# Patient Record
Sex: MALE | Race: WHITE | NOT HISPANIC OR LATINO | Employment: OTHER | URBAN - METROPOLITAN AREA
[De-identification: names, ages, dates, MRNs, and addresses within clinical notes are randomized per-mention and may not be internally consistent; named-entity substitution may affect disease eponyms.]

---

## 2019-07-30 ENCOUNTER — OFFICE VISIT (OUTPATIENT)
Dept: FAMILY MEDICINE | Facility: CLINIC | Age: 79
End: 2019-07-30
Payer: COMMERCIAL

## 2019-07-30 VITALS
BODY MASS INDEX: 27.58 KG/M2 | DIASTOLIC BLOOD PRESSURE: 80 MMHG | HEART RATE: 60 BPM | TEMPERATURE: 97 F | HEIGHT: 71 IN | OXYGEN SATURATION: 98 % | SYSTOLIC BLOOD PRESSURE: 130 MMHG | WEIGHT: 197 LBS

## 2019-07-30 DIAGNOSIS — Z00.00 MEDICARE ANNUAL WELLNESS VISIT, SUBSEQUENT: Primary | ICD-10-CM

## 2019-07-30 DIAGNOSIS — Z13.220 LIPID SCREENING: ICD-10-CM

## 2019-07-30 DIAGNOSIS — Z12.5 SCREENING FOR PROSTATE CANCER: ICD-10-CM

## 2019-07-30 LAB
ALBUMIN SERPL-MCNC: 3.8 G/DL (ref 3.4–5)
ALP SERPL-CCNC: 61 U/L (ref 40–150)
ALT SERPL W P-5'-P-CCNC: 24 U/L (ref 0–70)
ANION GAP SERPL CALCULATED.3IONS-SCNC: 3 MMOL/L (ref 3–14)
AST SERPL W P-5'-P-CCNC: 18 U/L (ref 0–45)
BASOPHILS # BLD AUTO: 0 10E9/L (ref 0–0.2)
BASOPHILS NFR BLD AUTO: 0.2 %
BILIRUB SERPL-MCNC: 0.9 MG/DL (ref 0.2–1.3)
BUN SERPL-MCNC: 14 MG/DL (ref 7–30)
CALCIUM SERPL-MCNC: 9.1 MG/DL (ref 8.5–10.1)
CHLORIDE SERPL-SCNC: 109 MMOL/L (ref 94–109)
CHOLEST SERPL-MCNC: 195 MG/DL
CO2 SERPL-SCNC: 28 MMOL/L (ref 20–32)
CREAT SERPL-MCNC: 0.91 MG/DL (ref 0.66–1.25)
DIFFERENTIAL METHOD BLD: NORMAL
EOSINOPHIL # BLD AUTO: 0 10E9/L (ref 0–0.7)
EOSINOPHIL NFR BLD AUTO: 0.5 %
ERYTHROCYTE [DISTWIDTH] IN BLOOD BY AUTOMATED COUNT: 14.6 % (ref 10–15)
GFR SERPL CREATININE-BSD FRML MDRD: 80 ML/MIN/{1.73_M2}
GLUCOSE SERPL-MCNC: 90 MG/DL (ref 70–99)
HCT VFR BLD AUTO: 44 % (ref 40–53)
HDLC SERPL-MCNC: 43 MG/DL
HGB BLD-MCNC: 14.9 G/DL (ref 13.3–17.7)
LDLC SERPL CALC-MCNC: 133 MG/DL
LYMPHOCYTES # BLD AUTO: 1.8 10E9/L (ref 0.8–5.3)
LYMPHOCYTES NFR BLD AUTO: 30.6 %
MCH RBC QN AUTO: 31.2 PG (ref 26.5–33)
MCHC RBC AUTO-ENTMCNC: 33.9 G/DL (ref 31.5–36.5)
MCV RBC AUTO: 92 FL (ref 78–100)
MONOCYTES # BLD AUTO: 0.7 10E9/L (ref 0–1.3)
MONOCYTES NFR BLD AUTO: 12.3 %
NEUTROPHILS # BLD AUTO: 3.4 10E9/L (ref 1.6–8.3)
NEUTROPHILS NFR BLD AUTO: 56.4 %
NONHDLC SERPL-MCNC: 152 MG/DL
PLATELET # BLD AUTO: 194 10E9/L (ref 150–450)
POTASSIUM SERPL-SCNC: 4.6 MMOL/L (ref 3.4–5.3)
PROT SERPL-MCNC: 7.5 G/DL (ref 6.8–8.8)
PSA SERPL-ACNC: 1.57 UG/L (ref 0–4)
RBC # BLD AUTO: 4.77 10E12/L (ref 4.4–5.9)
SODIUM SERPL-SCNC: 140 MMOL/L (ref 133–144)
TRIGL SERPL-MCNC: 95 MG/DL
WBC # BLD AUTO: 5.9 10E9/L (ref 4–11)

## 2019-07-30 PROCEDURE — 80053 COMPREHEN METABOLIC PANEL: CPT | Performed by: INTERNAL MEDICINE

## 2019-07-30 PROCEDURE — 85025 COMPLETE CBC W/AUTO DIFF WBC: CPT | Performed by: INTERNAL MEDICINE

## 2019-07-30 PROCEDURE — 36415 COLL VENOUS BLD VENIPUNCTURE: CPT | Performed by: INTERNAL MEDICINE

## 2019-07-30 PROCEDURE — G0103 PSA SCREENING: HCPCS | Performed by: INTERNAL MEDICINE

## 2019-07-30 PROCEDURE — 80061 LIPID PANEL: CPT | Performed by: INTERNAL MEDICINE

## 2019-07-30 PROCEDURE — G0439 PPPS, SUBSEQ VISIT: HCPCS | Performed by: INTERNAL MEDICINE

## 2019-07-30 SDOH — HEALTH STABILITY: MENTAL HEALTH: HOW MANY STANDARD DRINKS CONTAINING ALCOHOL DO YOU HAVE ON A TYPICAL DAY?: 1 OR 2

## 2019-07-30 SDOH — HEALTH STABILITY: MENTAL HEALTH: HOW MANY DRINKS CONTAINING ALCOHOL DO YOU HAVE ON A TYPICAL DAY WHEN YOU ARE DRINKING?: 1 OR 2

## 2019-07-30 ASSESSMENT — MIFFLIN-ST. JEOR: SCORE: 1630.72

## 2019-07-30 ASSESSMENT — ACTIVITIES OF DAILY LIVING (ADL): CURRENT_FUNCTION: NO ASSISTANCE NEEDED

## 2019-07-30 NOTE — PATIENT INSTRUCTIONS
Maintain low fat/calorie diet and regular exercise.    Follow up yearly or as needed.      Patient Education   Personalized Prevention Plan  You are due for the preventive services outlined below.  Your care team is available to assist you in scheduling these services.  If you have already completed any of these items, please share that information with your care team to update in your medical record.  Health Maintenance Due   Topic Date Due     Discuss Advance Care Planning  1940     Annual Wellness Visit  06/16/2005     FALL RISK ASSESSMENT  06/16/2005     Pneumococcal Vaccine (1 of 2 - PCV13) 06/16/2005     Zoster (Shingles) Vaccine (2 of 3) 12/27/2013

## 2019-07-30 NOTE — PROGRESS NOTES
SUBJECTIVE:   Yoel Stevenson is a 79 year old male who presents for Preventive Visit.    No subjective complaints presented.    Are you in the first 12 months of your Medicare coverage?  No    Healthy Habits:     In general, how would you rate your overall health?  Excellent    Frequency of exercise:  6-7 days/week    Duration of exercise:  45-60 minutes    Taking medications regularly:  Not Applicable    Barriers to taking medications:  Not applicable    Medication side effects:  Not applicable    Ability to successfully perform activities of daily living:  No assistance needed    Home Safety:  No safety concerns identified    Hearing impairment symptoms: heairing aids.    In the past 6 months, have you been bothered by leaking of urine?  No    In general, how would you rate your overall mental or emotional health?  Excellent      PHQ-2 Total Score: 1    Additional concerns today:  No    Do you feel safe in your environment? Yes    Do you have a Health Care Directive? Yes: Advance Directive has been received and scanned.      Fall risk  Fallen 2 or more times in the past year?: No  Any fall with injury in the past year?: No    Cognitive Screening   1) Repeat 3 items (Leader, Season, Table)    2) Clock draw: NORMAL  3) 3 item recall: Recalls 1 object   Results: NORMAL clock, 3 items recalled: COGNITIVE IMPAIRMENT LESS LIKELY    Mini-CogTM Copyright [employer]. Licensed by the author for use in [facility]; reprinted with permission (soob@.[facility]). All rights reserved.      Do you have sleep apnea, excessive snoring or daytime drowsiness?: no    Reviewed and updated as needed this visit by clinical staff  Tobacco  Allergies  Meds  Problems  Med Hx  Surg Hx  Soc Hx        Reviewed and updated as needed this visit by Provider  Allergies  Meds  Problems  Med Hx  Surg Hx        Social History     Tobacco Use     Smoking status: Never Smoker     Smokeless tobacco: Never Used   Substance Use Topics  "    Alcohol use: Yes     Drinks per session: 1 or 2       Current providers sharing in care for this patient include:   Patient Care Team:  Calin Courtney MD as PCP - General (Internal Medicine)    The following health maintenance items are reviewed in Epic and correct as of today:  Health Maintenance   Topic Date Due     ADVANCE CARE PLANNING  1940     MEDICARE ANNUAL WELLNESS VISIT  06/16/2005     FALL RISK ASSESSMENT  06/16/2005     PNEUMOCOCCAL IMMUNIZATION 65+ LOW/MEDIUM RISK (1 of 2 - PCV13) 06/16/2005     ZOSTER IMMUNIZATION (2 of 3) 12/27/2013     INFLUENZA VACCINE (1) 09/01/2019     LIPID  07/30/2024     DTAP/TDAP/TD IMMUNIZATION (2 - Td) 08/12/2026     PHQ-2  Completed     IPV IMMUNIZATION  Aged Out     MENINGITIS IMMUNIZATION  Aged Out       Review of Systems   Constitutional: Negative for chills, fatigue and fever.   HENT: Negative for congestion, ear pain, hearing loss, sore throat and trouble swallowing.    Eyes: Negative for pain and visual disturbance.   Respiratory: Negative for cough and shortness of breath.    Cardiovascular: Negative for chest pain and palpitations.   Gastrointestinal: Negative for abdominal pain, blood in stool, constipation, diarrhea, nausea and vomiting.   Genitourinary: Negative for difficulty urinating and testicular pain.   Musculoskeletal: Negative for arthralgias, back pain, myalgias and neck pain.   Skin: Negative for rash.   Neurological: Negative for dizziness, light-headedness, numbness and headaches.   Psychiatric/Behavioral: Negative for sleep disturbance. The patient is not nervous/anxious.        OBJECTIVE:   /80 (BP Location: Right arm, Patient Position: Chair, Cuff Size: Adult Regular)   Pulse 60   Temp 97  F (36.1  C) (Oral)   Ht 1.803 m (5' 11\")   Wt 89.4 kg (197 lb)   SpO2 98%   BMI 27.48 kg/m   Estimated body mass index is 27.48 kg/m  as calculated from the following:    Height as of this encounter: 1.803 m (5' 11\").    " Weight as of this encounter: 89.4 kg (197 lb).    Physical Exam   Constitutional: He is oriented to person, place, and time. No distress.   HENT:   Right Ear: External ear normal.   Left Ear: External ear normal.   Mouth/Throat: Oropharynx is clear and moist.   Eyes: Pupils are equal, round, and reactive to light. Conjunctivae are normal.   Neck: No thyromegaly present.   Cardiovascular: Normal rate, regular rhythm and normal heart sounds.   Pulmonary/Chest: Effort normal and breath sounds normal. No respiratory distress.   Abdominal: Soft. There is no tenderness.   Genitourinary: No discharge found.   Musculoskeletal: Normal range of motion. He exhibits no edema or tenderness.   Lymphadenopathy:     He has no cervical adenopathy.   Neurological: He is alert and oriented to person, place, and time. Coordination normal.   Skin: No rash noted.   Psychiatric: He has a normal mood and affect.   Nursing note and vitals reviewed.      ASSESSMENT / PLAN:       ICD-10-CM    1. Medicare annual wellness visit, subsequent Z00.00 CBC with platelets differential     Comprehensive metabolic panel   2. Screening for prostate cancer Z12.5 Prostate spec antigen screen   3. Lipid screening Z13.220 Lipid panel reflex to direct LDL Fasting       Patient Instructions     Maintain low fat/calorie diet and regular exercise.    Follow up yearly or as needed.      Patient Education   Personalized Prevention Plan  You are due for the preventive services outlined below.  Your care team is available to assist you in scheduling these services.  If you have already completed any of these items, please share that information with your care team to update in your medical record.  Health Maintenance Due   Topic Date Due     Discuss Advance Care Planning  1940     Annual Wellness Visit  06/16/2005     FALL RISK ASSESSMENT  06/16/2005     Pneumococcal Vaccine (1 of 2 - PCV13) 06/16/2005     Zoster (Shingles) Vaccine (2 of 3) 12/27/2013     End  "of Life Planning:  Patient currently has an advanced directive: No.  I have verified the patient's ablity to prepare an advanced directive/make health care decisions.  Literature was provided to assist patient in preparing an advanced directive.    COUNSELING:  Reviewed preventive health counseling, as reflected in patient instructions    Estimated body mass index is 27.48 kg/m  as calculated from the following:    Height as of this encounter: 1.803 m (5' 11\").    Weight as of this encounter: 89.4 kg (197 lb).     reports that he has never smoked. He has never used smokeless tobacco.      Appropriate preventive services were discussed with this patient, including applicable screening as appropriate for cardiovascular disease, diabetes, osteopenia/osteoporosis, and glaucoma.  As appropriate for age/gender, discussed screening for colorectal cancer, prostate cancer, breast cancer, and cervical cancer. Checklist reviewing preventive services available has been given to the patient.    Reviewed patients plan of care and provided an AVS. The Basic Care Plan (routine screening as documented in Health Maintenance) for Yoel meets the Care Plan requirement. This Care Plan has been established and reviewed with the Patient.    Counseling Resources:  ATP IV Guidelines  Pooled Cohorts Equation Calculator  Breast Cancer Risk Calculator  FRAX Risk Assessment  ICSI Preventive Guidelines  Dietary Guidelines for Americans, 2010  [employer]'s MyPlate  ASA Prophylaxis  Lung CA Screening    Calin Courtney MD  [facility]    Identified Health Risks:  "

## 2019-07-30 NOTE — LETTER
[facility]  [address] [facility]  [address]  [place]  [address]  Tel: [phone]    July 31, 2019    Yoel Stevenson  [address] [facility] [place] [address]        Good day to you Mr. Stevenson.    Your prostate cancer screening test is normal.    Your blood counts, electrolytes, blood sugar (glucose), kidney and liver function are within normal limits.    Your LDL cholesterol is elevated.  This is often called the bad cholesterol and high levels increase the risk for heart attacks and strokes.  I would suggest a stricter low-fat diet and regular exercise.    Dr. Sherwin Dean/SAI        Enclosure: Lab Results  Results for orders placed or performed in visit on 07/30/19   Prostate spec antigen screen   Result Value Ref Range    PSA 1.57 0 - 4 ug/L   CBC with platelets differential   Result Value Ref Range    WBC 5.9 4.0 - 11.0 10e9/L    RBC Count 4.77 4.4 - 5.9 10e12/L    Hemoglobin 14.9 13.3 - 17.7 g/dL    Hematocrit 44.0 40.0 - 53.0 %    MCV 92 78 - 100 fl    MCH 31.2 26.5 - 33.0 pg    MCHC 33.9 31.5 - 36.5 g/dL    RDW 14.6 10.0 - 15.0 %    Platelet Count 194 150 - 450 10e9/L    % Neutrophils 56.4 %    % Lymphocytes 30.6 %    % Monocytes 12.3 %    % Eosinophils 0.5 %    % Basophils 0.2 %    Absolute Neutrophil 3.4 1.6 - 8.3 10e9/L    Absolute Lymphocytes 1.8 0.8 - 5.3 10e9/L    Absolute Monocytes 0.7 0.0 - 1.3 10e9/L    Absolute Eosinophils 0.0 0.0 - 0.7 10e9/L    Absolute Basophils 0.0 0.0 - 0.2 10e9/L    Diff Method Automated Method    Comprehensive metabolic panel   Result Value Ref Range    Sodium 140 133 - 144 mmol/L    Potassium 4.6 3.4 - 5.3 mmol/L    Chloride 109 94 - 109 mmol/L    Carbon Dioxide 28 20 - 32 mmol/L    Anion Gap 3 3 - 14 mmol/L    Glucose 90 70 - 99 mg/dL    Urea Nitrogen 14 7 - 30 mg/dL    Creatinine 0.91 0.66 - 1.25 mg/dL    GFR Estimate 80 >60 mL/min/[1.73_m2]    GFR Estimate If Black >90 >60 mL/min/[1.73_m2]    Calcium 9.1 8.5 - 10.1 mg/dL    Bilirubin Total 0.9 0.2 -  1.3 mg/dL    Albumin 3.8 3.4 - 5.0 g/dL    Protein Total 7.5 6.8 - 8.8 g/dL    Alkaline Phosphatase 61 40 - 150 U/L    ALT 24 0 - 70 U/L    AST 18 0 - 45 U/L   Lipid panel reflex to direct LDL Fasting   Result Value Ref Range    Cholesterol 195 <200 mg/dL    Triglycerides 95 <150 mg/dL    HDL Cholesterol 43 >39 mg/dL    LDL Cholesterol Calculated 133 (H) <100 mg/dL    Non HDL Cholesterol 152 (H) <130 mg/dL

## 2019-07-31 ASSESSMENT — ENCOUNTER SYMPTOMS
ARTHRALGIAS: 0
BACK PAIN: 0
SLEEP DISTURBANCE: 0
NAUSEA: 0
DIFFICULTY URINATING: 0
CONSTIPATION: 0
NECK PAIN: 0
TROUBLE SWALLOWING: 0
FEVER: 0
LIGHT-HEADEDNESS: 0
HEADACHES: 0
COUGH: 0
CHILLS: 0
DIZZINESS: 0
NUMBNESS: 0
EYE PAIN: 0
ABDOMINAL PAIN: 0
DIARRHEA: 0
MYALGIAS: 0
VOMITING: 0
FATIGUE: 0
PALPITATIONS: 0
NERVOUS/ANXIOUS: 0
SHORTNESS OF BREATH: 0
SORE THROAT: 0
BLOOD IN STOOL: 0

## 2019-08-01 ENCOUNTER — DOCUMENTATION ONLY (OUTPATIENT)
Dept: OTHER | Facility: CLINIC | Age: 79
End: 2019-08-01

## 2019-09-25 ENCOUNTER — TELEPHONE (OUTPATIENT)
Dept: FAMILY MEDICINE | Facility: CLINIC | Age: 79
End: 2019-09-25

## 2019-09-25 NOTE — TELEPHONE ENCOUNTER
Reason for call:  Patient reporting a symptom    Symptom or request: pt called he is still urinating about 3-4 times per night. Pt was suppose to call with a update. Would like a call back about getting on a medication to help with this    Duration (how long have symptoms been present): since last ov    Have you been treated for this before? yes    Additional comments: please call to discuss     Phone Number patient can be reached at:  [phone]    Best Time:  Any     Can we leave a detailed message on this number:  YES    Call taken on 9/25/2019 at 12:42 PM by Simon Wilder

## 2019-09-25 NOTE — TELEPHONE ENCOUNTER
"Patient has only seen  X 1 (7/30/19) for a Medicare Wellness Exam. Will need OV to discuss.    No documentation of urinary issues.  Left message on VM to \"call back to schedule office visit with  regarding his recent concerns\".    Please schedule appt.when patient calls back.  Contact RN staff if he needs anything urgently.    Thank you,  Emily Crespo RN    "

## 2019-09-27 ENCOUNTER — OFFICE VISIT (OUTPATIENT)
Dept: FAMILY MEDICINE | Facility: CLINIC | Age: 79
End: 2019-09-27
Payer: COMMERCIAL

## 2019-09-27 VITALS
TEMPERATURE: 97.6 F | HEART RATE: 64 BPM | DIASTOLIC BLOOD PRESSURE: 72 MMHG | OXYGEN SATURATION: 98 % | SYSTOLIC BLOOD PRESSURE: 124 MMHG | WEIGHT: 201.3 LBS | HEIGHT: 71 IN | BODY MASS INDEX: 28.18 KG/M2

## 2019-09-27 DIAGNOSIS — R35.0 BENIGN PROSTATIC HYPERPLASIA WITH URINARY FREQUENCY: Primary | ICD-10-CM

## 2019-09-27 DIAGNOSIS — N52.9 ERECTILE DYSFUNCTION, UNSPECIFIED ERECTILE DYSFUNCTION TYPE: ICD-10-CM

## 2019-09-27 DIAGNOSIS — R35.0 URINARY FREQUENCY: ICD-10-CM

## 2019-09-27 DIAGNOSIS — N40.1 BENIGN PROSTATIC HYPERPLASIA WITH URINARY FREQUENCY: Primary | ICD-10-CM

## 2019-09-27 DIAGNOSIS — Z23 NEED FOR PROPHYLACTIC VACCINATION AND INOCULATION AGAINST INFLUENZA: ICD-10-CM

## 2019-09-27 LAB
ALBUMIN UR-MCNC: NEGATIVE MG/DL
APPEARANCE UR: CLEAR
BILIRUB UR QL STRIP: NEGATIVE
COLOR UR AUTO: YELLOW
GLUCOSE UR STRIP-MCNC: NEGATIVE MG/DL
HGB UR QL STRIP: NEGATIVE
KETONES UR STRIP-MCNC: NEGATIVE MG/DL
LEUKOCYTE ESTERASE UR QL STRIP: NEGATIVE
NITRATE UR QL: NEGATIVE
PH UR STRIP: 5.5 PH (ref 5–7)
SOURCE: NORMAL
SP GR UR STRIP: 1.02 (ref 1–1.03)
UROBILINOGEN UR STRIP-ACNC: 0.2 EU/DL (ref 0.2–1)

## 2019-09-27 PROCEDURE — 90662 IIV NO PRSV INCREASED AG IM: CPT | Performed by: INTERNAL MEDICINE

## 2019-09-27 PROCEDURE — 81003 URINALYSIS AUTO W/O SCOPE: CPT | Performed by: INTERNAL MEDICINE

## 2019-09-27 PROCEDURE — G0008 ADMIN INFLUENZA VIRUS VAC: HCPCS | Performed by: INTERNAL MEDICINE

## 2019-09-27 PROCEDURE — 99214 OFFICE O/P EST MOD 30 MIN: CPT | Mod: 25 | Performed by: INTERNAL MEDICINE

## 2019-09-27 RX ORDER — TADALAFIL 5 MG/1
5 TABLET ORAL EVERY 24 HOURS
Qty: 90 TABLET | Refills: 1 | Status: SHIPPED | OUTPATIENT
Start: 2019-09-27 | End: 2020-01-02

## 2019-09-27 RX ORDER — TADALAFIL 5 MG/1
5 TABLET ORAL EVERY 24 HOURS
Qty: 30 TABLET | Refills: 3 | Status: SHIPPED | OUTPATIENT
Start: 2019-09-27 | End: 2019-09-27

## 2019-09-27 ASSESSMENT — MIFFLIN-ST. JEOR: SCORE: 1650.22

## 2019-09-27 ASSESSMENT — ENCOUNTER SYMPTOMS
ABDOMINAL PAIN: 0
FEVER: 0
NAUSEA: 0
FREQUENCY: 1
CHILLS: 0
VOMITING: 0
HEMATURIA: 0
FATIGUE: 0
DYSURIA: 0

## 2019-09-27 NOTE — PROGRESS NOTES
"    Yoel Stevenson is a 79 year old male who presents to clinic today for the following health issues:    HPI     Patient has been experiencing chronic urinary frequency.  It has come to the point where it has been disruptive.  He would need to wake up 3-4 times every night to urinate.  He denies dysuria, urgency, hematuria or any other urination symptoms.    Patient also has difficulty with sustaining erections.  He would like to try medication to help him with this.      Past Medical History:   Diagnosis Date     BPH with urinary obstruction        Review of Systems   Constitutional: Negative for chills, fatigue and fever.   Gastrointestinal: Negative for abdominal pain, nausea and vomiting.   Genitourinary: Positive for frequency. Negative for decreased urine volume, dysuria, hematuria, penile pain and urgency.       /72 (BP Location: Left arm, Patient Position: Sitting, Cuff Size: Adult Regular)   Pulse 64   Temp 97.6  F (36.4  C) (Tympanic)   Ht 1.803 m (5' 11\")   Wt 91.3 kg (201 lb 4.8 oz)   SpO2 98%   BMI 28.08 kg/m      Physical Exam  Neck:      Thyroid: No thyromegaly.   Abdominal:      Palpations: Abdomen is soft.      Tenderness: There is no tenderness.   Neurological:      Mental Status: He is alert and oriented to person, place, and time.   Psychiatric:         Mood and Affect: Mood normal.         ICD-10-CM    1. Benign prostatic hyperplasia with urinary frequency N40.1 tadalafil (CIALIS) 5 MG tablet    R35.0    2. Erectile dysfunction, unspecified erectile dysfunction type N52.9 tadalafil (CIALIS) 5 MG tablet   3. Urinary frequency R35.0 *UA reflex to Microscopic and Culture ([facility] (except [place] and [place])   4. Need for prophylactic vaccination and inoculation against influenza Z23 INFLUENZA (HIGH DOSE) 3 VALENT VACCINE [73979]     Vaccine Administration, Initial [68673]     ADMIN INFLUENZA (For MEDICARE Patients ONLY) []       Patient Instructions "   Take Cialis as directed daily.  Call doctor if you develop any side effects from the medication.    Proceed to the emergency room for any erection lasting longer than 2 hours.    Call doctor if your urination frequency or difficulty with erections persist/worsens, or if you develop new symptoms.

## 2019-09-27 NOTE — PATIENT INSTRUCTIONS
Take Cialis as directed daily.  Call doctor if you develop any side effects from the medication.    Proceed to the emergency room for any erection lasting longer than 2 hours.    Call doctor if your urination frequency or difficulty with erections persist/worsens, or if you develop new symptoms.

## 2019-12-31 ENCOUNTER — TELEPHONE (OUTPATIENT)
Dept: FAMILY MEDICINE | Facility: CLINIC | Age: 79
End: 2019-12-31

## 2019-12-31 DIAGNOSIS — R35.0 BENIGN PROSTATIC HYPERPLASIA WITH URINARY FREQUENCY: ICD-10-CM

## 2019-12-31 DIAGNOSIS — N40.1 BENIGN PROSTATIC HYPERPLASIA WITH URINARY FREQUENCY: ICD-10-CM

## 2019-12-31 DIAGNOSIS — N52.9 ERECTILE DYSFUNCTION, UNSPECIFIED ERECTILE DYSFUNCTION TYPE: ICD-10-CM

## 2020-01-02 RX ORDER — TADALAFIL 5 MG/1
5 TABLET ORAL EVERY 24 HOURS
Qty: 90 TABLET | Refills: 1 | Status: SHIPPED | OUTPATIENT
Start: 2020-01-02 | End: 2020-01-21

## 2020-01-02 NOTE — TELEPHONE ENCOUNTER
Routing refill request to provider for review/approval because:  Last written #90 R1 on 9/27/19 as local print.  Please authorize if appropriate.  Thanks,  Meg Hollis RN

## 2020-01-09 NOTE — TELEPHONE ENCOUNTER
Dr. Courtney,    Would you like me to follow through on the prior authorization?    Siva Valdez, CMA on 1/9/2020 at 1:50 PM

## 2020-01-09 NOTE — TELEPHONE ENCOUNTER
[facility] called and reported that the INS doesn't cover this medication. They are wanting to know if Dr. Courtney would prescribe an alternative or give a prior auth.  They did start a PA request just in case and the key is [id number] though covermymeds.

## 2020-01-13 ENCOUNTER — TELEPHONE (OUTPATIENT)
Dept: FAMILY MEDICINE | Facility: CLINIC | Age: 80
End: 2020-01-13

## 2020-01-13 DIAGNOSIS — R35.0 BENIGN PROSTATIC HYPERPLASIA WITH URINARY FREQUENCY: ICD-10-CM

## 2020-01-13 DIAGNOSIS — N40.1 BENIGN PROSTATIC HYPERPLASIA WITH URINARY FREQUENCY: ICD-10-CM

## 2020-01-13 DIAGNOSIS — N52.9 ERECTILE DYSFUNCTION, UNSPECIFIED ERECTILE DYSFUNCTION TYPE: ICD-10-CM

## 2020-01-13 NOTE — TELEPHONE ENCOUNTER
Reason for Call:  Medication      Name of the pharmacy and phone number for the current request:    [facility]   Ph. [phone]      Name of the medication requested:   tadalafil (CIALIS) 5 MG tablet 90 tablet       Other request: this is a follow-up to previous request Ref: [id number] with [facility] pharmacy and the prior auth or generic med    Can we leave a detailed message on this number? YES    Phone number patient can be reached at: Cell number on file:    Telephone Information:   Mobile [phone]       Best Time: any    Call taken on 1/13/2020 at 10:49 AM by Belkys Taveras

## 2020-01-13 NOTE — TELEPHONE ENCOUNTER
Prior Authorization Retail Medication Request    Medication/Dose: Tadalfil 5 mg  ICD code (if different than what is on RX):  N40.1, R35.0  Previously Tried and Failed:  None  Rationale:  Patient stable on medication for resolution of BPH symptoms    Insurance Name:  [employer]  Insurance ID:  [id number]      Pharmacy Information (if different than what is on RX)  Name:  [facility]  Phone:  [phone]

## 2020-01-13 NOTE — TELEPHONE ENCOUNTER
Yes please, let's try prior auth and if not covered then we'll come up with an alternative.  Please inform patient of prior auth process.

## 2020-01-13 NOTE — TELEPHONE ENCOUNTER
[facility] called and reported that the INS doesn't cover this medication. They are wanting to know if Dr. Courtney would prescribe an alternative or give a prior auth.  They did start a PA request just in case and the key is [id number] though covermymeds.

## 2020-01-14 NOTE — TELEPHONE ENCOUNTER
Central Prior Authorization Team   Phone: [phone]      PA Initiation    Medication: Tadalfil 5 mg  Insurance Company: [employer] - Phone [phone] Fax [phone]  Pharmacy Filling the Rx: [facility] [address]  Filling Pharmacy Phone: [phone]  Filling Pharmacy Fax:    Start Date: 1/14/2020

## 2020-01-14 NOTE — TELEPHONE ENCOUNTER
PRIOR AUTHORIZATION DENIED    Medication: Tadalfil 5 mg    Denial Date: 1/14/2020    Denial Rational:          Appeal Information:    If you would like to appeal, please supply P/A team with a letter of medical necessity with clinical reason.

## 2020-01-15 NOTE — TELEPHONE ENCOUNTER
Please inform patient that this kind of medication is not covered by his insurance and will unlikely be covered even with an appeal.  He can get 90 tablets of generic Cialis at [facility] for $40+ using [employer] coupon.

## 2020-01-17 NOTE — TELEPHONE ENCOUNTER
Pt called in to check on the status of this.  Informed that the Cialis has been declined by insurance, however that he can go on [employer] and browse for the best deal.  He will call back with a pharmacy in [place], as that is where he is located, to have the Rx sent to.

## 2020-01-21 RX ORDER — TADALAFIL 5 MG/1
5 TABLET ORAL EVERY 24 HOURS
Qty: 90 TABLET | Refills: 1 | Status: SHIPPED | OUTPATIENT
Start: 2020-01-21 | End: 2020-11-17

## 2020-01-21 NOTE — TELEPHONE ENCOUNTER
Reason for Call:  Other prescription    Detailed comments: Yoel calling back in. Informed him again on the same status update given to him on 1/17/20 by Claudine.     He is wondering if Generic Cialis can be sent to the     [facility]  [address] [place] [id number] [phone]    Please see if possible to send RX.    Phone Number Patient can be reached at: Cell number on file:    Telephone Information:   Mobile [phone]       Best Time: any    Can we leave a detailed message on this number? YES    Call taken on 1/21/2020 at 11:52 AM by Kee Cardenas

## 2020-01-21 NOTE — TELEPHONE ENCOUNTER
Patient is planning on using [employer] coupon to get more affordable pricing on generic Tadalafil. Pharmacy and Rx pended for signature.    Siva Valdez CMA on 1/21/2020 at 12:18 PM

## 2020-01-23 NOTE — TELEPHONE ENCOUNTER
Pt is calling to follow up on this.  He would still like to get this through [employer].    [phone]   ok to leave a message

## 2020-01-27 NOTE — TELEPHONE ENCOUNTER
Message left for patient that if he wanted medication to go through [employer], it would be more out of pocket for patient than if he were to use [facility] or a [facility] pharmacy. Clinic number left for patient in case he has any questions regarding this message.    Siva Valdez, DINA on 1/27/2020 at 10:44 AM

## 2020-08-15 ENCOUNTER — APPOINTMENT (OUTPATIENT)
Dept: CT IMAGING | Facility: CLINIC | Age: 80
End: 2020-08-15
Attending: PHYSICIAN ASSISTANT
Payer: COMMERCIAL

## 2020-08-15 ENCOUNTER — HOSPITAL ENCOUNTER (EMERGENCY)
Facility: CLINIC | Age: 80
Discharge: ANOTHER HOSPITAL | End: 2020-08-15
Attending: PHYSICIAN ASSISTANT | Admitting: PHYSICIAN ASSISTANT
Payer: COMMERCIAL

## 2020-08-15 ENCOUNTER — TRANSFERRED RECORDS (OUTPATIENT)
Dept: HEALTH INFORMATION MANAGEMENT | Facility: CLINIC | Age: 80
End: 2020-08-15

## 2020-08-15 VITALS
TEMPERATURE: 97.6 F | RESPIRATION RATE: 10 BRPM | DIASTOLIC BLOOD PRESSURE: 104 MMHG | HEIGHT: 71 IN | BODY MASS INDEX: 27.58 KG/M2 | OXYGEN SATURATION: 98 % | SYSTOLIC BLOOD PRESSURE: 162 MMHG | HEART RATE: 59 BPM | WEIGHT: 197 LBS

## 2020-08-15 DIAGNOSIS — R03.0 ELEVATED BLOOD PRESSURE READING WITHOUT DIAGNOSIS OF HYPERTENSION: ICD-10-CM

## 2020-08-15 DIAGNOSIS — R47.01 EXPRESSIVE APHASIA: ICD-10-CM

## 2020-08-15 DIAGNOSIS — I63.9 CEREBROVASCULAR ACCIDENT (H): Primary | ICD-10-CM

## 2020-08-15 LAB
ANION GAP SERPL CALCULATED.3IONS-SCNC: 2 MMOL/L (ref 3–14)
APTT PPP: 30 SEC (ref 22–37)
BASOPHILS # BLD AUTO: 0 10E9/L (ref 0–0.2)
BASOPHILS NFR BLD AUTO: 0.2 %
BUN SERPL-MCNC: 18 MG/DL (ref 7–30)
CALCIUM SERPL-MCNC: 8.7 MG/DL (ref 8.5–10.1)
CHLORIDE SERPL-SCNC: 106 MMOL/L (ref 94–109)
CO2 SERPL-SCNC: 32 MMOL/L (ref 20–32)
CREAT SERPL-MCNC: 0.93 MG/DL (ref 0.66–1.25)
DIFFERENTIAL METHOD BLD: NORMAL
EOSINOPHIL # BLD AUTO: 0 10E9/L (ref 0–0.7)
EOSINOPHIL NFR BLD AUTO: 0.6 %
ERYTHROCYTE [DISTWIDTH] IN BLOOD BY AUTOMATED COUNT: 14.7 % (ref 10–15)
GFR SERPL CREATININE-BSD FRML MDRD: 77 ML/MIN/{1.73_M2}
GLUCOSE SERPL-MCNC: 132 MG/DL (ref 70–99)
HCT VFR BLD AUTO: 42.5 % (ref 40–53)
HGB BLD-MCNC: 13.9 G/DL (ref 13.3–17.7)
IMM GRANULOCYTES # BLD: 0 10E9/L (ref 0–0.4)
IMM GRANULOCYTES NFR BLD: 0 %
INR PPP: 1.12 (ref 0.86–1.14)
LYMPHOCYTES # BLD AUTO: 2.5 10E9/L (ref 0.8–5.3)
LYMPHOCYTES NFR BLD AUTO: 39.4 %
MCH RBC QN AUTO: 29.8 PG (ref 26.5–33)
MCHC RBC AUTO-ENTMCNC: 32.7 G/DL (ref 31.5–36.5)
MCV RBC AUTO: 91 FL (ref 78–100)
MONOCYTES # BLD AUTO: 0.7 10E9/L (ref 0–1.3)
MONOCYTES NFR BLD AUTO: 10.8 %
NEUTROPHILS # BLD AUTO: 3.1 10E9/L (ref 1.6–8.3)
NEUTROPHILS NFR BLD AUTO: 49 %
PLATELET # BLD AUTO: 205 10E9/L (ref 150–450)
POTASSIUM SERPL-SCNC: 4 MMOL/L (ref 3.4–5.3)
RBC # BLD AUTO: 4.67 10E12/L (ref 4.4–5.9)
SODIUM SERPL-SCNC: 140 MMOL/L (ref 133–144)
TROPONIN I SERPL-MCNC: <0.015 UG/L (ref 0–0.04)
WBC # BLD AUTO: 6.3 10E9/L (ref 4–11)

## 2020-08-15 PROCEDURE — 85610 PROTHROMBIN TIME: CPT | Performed by: PHYSICIAN ASSISTANT

## 2020-08-15 PROCEDURE — U0003 INFECTIOUS AGENT DETECTION BY NUCLEIC ACID (DNA OR RNA); SEVERE ACUTE RESPIRATORY SYNDROME CORONAVIRUS 2 (SARS-COV-2) (CORONAVIRUS DISEASE [COVID-19]), AMPLIFIED PROBE TECHNIQUE, MAKING USE OF HIGH THROUGHPUT TECHNOLOGIES AS DESCRIBED BY CMS-2020-01-R: HCPCS | Performed by: PHYSICIAN ASSISTANT

## 2020-08-15 PROCEDURE — C9803 HOPD COVID-19 SPEC COLLECT: HCPCS

## 2020-08-15 PROCEDURE — 96365 THER/PROPH/DIAG IV INF INIT: CPT | Mod: 59

## 2020-08-15 PROCEDURE — 99207 ZZC CDG-CODE CATEGORY CHANGED: CPT | Performed by: PSYCHIATRY & NEUROLOGY

## 2020-08-15 PROCEDURE — 37195 THROMBOLYTIC THERAPY STROKE: CPT

## 2020-08-15 PROCEDURE — 93005 ELECTROCARDIOGRAM TRACING: CPT

## 2020-08-15 PROCEDURE — 96361 HYDRATE IV INFUSION ADD-ON: CPT

## 2020-08-15 PROCEDURE — 25000128 H RX IP 250 OP 636: Performed by: PHYSICIAN ASSISTANT

## 2020-08-15 PROCEDURE — 70498 CT ANGIOGRAPHY NECK: CPT

## 2020-08-15 PROCEDURE — 99291 CRITICAL CARE FIRST HOUR: CPT | Mod: 25

## 2020-08-15 PROCEDURE — 70450 CT HEAD/BRAIN W/O DYE: CPT | Mod: XS

## 2020-08-15 PROCEDURE — 99205 OFFICE O/P NEW HI 60 MIN: CPT | Performed by: PSYCHIATRY & NEUROLOGY

## 2020-08-15 PROCEDURE — 84484 ASSAY OF TROPONIN QUANT: CPT | Performed by: PHYSICIAN ASSISTANT

## 2020-08-15 PROCEDURE — 0042T CT HEAD PERFUSION WITH CONTRAST: CPT

## 2020-08-15 PROCEDURE — 85025 COMPLETE CBC W/AUTO DIFF WBC: CPT | Performed by: PHYSICIAN ASSISTANT

## 2020-08-15 PROCEDURE — 85730 THROMBOPLASTIN TIME PARTIAL: CPT | Performed by: PHYSICIAN ASSISTANT

## 2020-08-15 PROCEDURE — 25000125 ZZHC RX 250: Performed by: PHYSICIAN ASSISTANT

## 2020-08-15 PROCEDURE — 80048 BASIC METABOLIC PNL TOTAL CA: CPT | Performed by: PHYSICIAN ASSISTANT

## 2020-08-15 PROCEDURE — 25800030 ZZH RX IP 258 OP 636: Performed by: PHYSICIAN ASSISTANT

## 2020-08-15 RX ORDER — VIT C/E/ZN/COPPR/LUTEIN/ZEAXAN 60 MG-6 MG
1 CAPSULE ORAL DAILY
COMMUNITY

## 2020-08-15 RX ORDER — IOPAMIDOL 755 MG/ML
120 INJECTION, SOLUTION INTRAVASCULAR ONCE
Status: COMPLETED | OUTPATIENT
Start: 2020-08-15 | End: 2020-08-15

## 2020-08-15 RX ORDER — ACETAMINOPHEN 325 MG/1
650 TABLET ORAL EVERY 6 HOURS PRN
COMMUNITY

## 2020-08-15 RX ORDER — LABETALOL HYDROCHLORIDE 5 MG/ML
10 INJECTION, SOLUTION INTRAVENOUS ONCE
Status: DISCONTINUED | OUTPATIENT
Start: 2020-08-15 | End: 2020-08-15 | Stop reason: HOSPADM

## 2020-08-15 RX ADMIN — SODIUM CHLORIDE 100 ML: 9 INJECTION, SOLUTION INTRAVENOUS at 14:22

## 2020-08-15 RX ADMIN — ALTEPLASE 8.05 MG: KIT at 15:10

## 2020-08-15 RX ADMIN — SODIUM CHLORIDE 500 ML: 9 INJECTION, SOLUTION INTRAVENOUS at 14:41

## 2020-08-15 RX ADMIN — SODIUM CHLORIDE, PRESERVATIVE FREE 100 ML: 5 INJECTION INTRAVENOUS at 16:12

## 2020-08-15 RX ADMIN — ALTEPLASE 72.41 MG: KIT at 15:11

## 2020-08-15 RX ADMIN — IOPAMIDOL 120 ML: 755 INJECTION, SOLUTION INTRAVENOUS at 14:22

## 2020-08-15 ASSESSMENT — ENCOUNTER SYMPTOMS
WEAKNESS: 1
NUMBNESS: 1
FACIAL ASYMMETRY: 0
SPEECH DIFFICULTY: 1

## 2020-08-15 ASSESSMENT — MIFFLIN-ST. JEOR: SCORE: 1625.72

## 2020-08-15 NOTE — ED PROVIDER NOTES
"Emergency Department Attending Supervision Note  8/15/2020  3:00 PM     I evaluated this patient in conjunction with Nazia Nava PA-C.      Briefly, this healthy 80-year-old man presented with now resolved symptoms.  He was last well about 10 minutes prior to arrival when he suddenly had difficulty \"getting his words out\" with somewhat slurred speech.  He also reported numbness and weakness of his left hand.  His speech difficulties resolved within about 5 minutes and the hand tingling and weakness resolved in triage (after about 15 minutes) such that he is asymptomatic.  Notably, there was no obvious facial droop per his wife's report and he was able to ambulate independently.     On my exam, Yoel appears well.  He has very mild questionable pronation on the left without true pronator drift and no other focal neurologic findings.    [employer] Stroke Scale  Exam Interval: Baseline   Score    Level of consciousness: (0)   Alert, keenly responsive    LOC questions: (0)   Answers both questions correctly    LOC commands: (0)   Performs both tasks correctly    Best gaze: (0)   Normal    Visual: (0)   No visual loss    Facial palsy: (0)   Normal symmetrical movements    Motor arm (left): (0)   No drift    Motor arm (right): (0)   No drift    Motor leg (left): (0)   No drift    Motor leg (right): (0)   No drift    Limb ataxia: (0)   Absent    Sensory: (0)   Normal- no sensory loss    Best language: (0)   Normal- no aphasia    Dysarthria: (0)   Normal    Extinction and inattention: (0)   No abnormality        Total Score:  0     Patient Vitals for the past 24 hrs:   BP Temp Temp src Pulse Heart Rate Resp SpO2 Height Weight   08/15/20 1600 (!) 165/97 -- -- 53 53 28 97 % -- --   08/15/20 1545 (!) 158/95 -- -- 52 53 26 97 % -- --   08/15/20 1530 (!) 154/88 -- -- 54 54 26 98 % -- --   08/15/20 1515 (!) 164/90 -- -- 56 56 (!) 6 98 % -- --   08/15/20 1500 (!) 162/83 -- -- 58 57 -- 97 % -- -- " "  08/15/20 1445 (!) 170/81 -- -- 58 -- 22 97 % -- --   08/15/20 1415 (!) 160/77 -- -- 54 -- 20 99 % -- --   08/15/20 1410 (!) 185/98 -- -- 64 -- 20 97 % -- --   08/15/20 1408 (!) 203/108 97.6  F (36.4  C) Temporal 62 -- 16 98 % 1.803 m (5' 11\") 89.4 kg (197 lb)     Results:  CT Head Perfusion w Contrast   Final Result   IMPRESSION: There is relative prolongation of contrast transit time to   the right cerebral hemisphere, manifesting as increased relative   time-to-peak and Tmax in the right cerebral hemisphere. This appears   to be relatively more pronounced in the region of the right   LUCIA-MCA/MCA-PCA and internal border zones. This is likely related to   the relatively severe proximal right cervical internal carotid artery   stenosis. There is no definite focal area of low cerebral blood volume   to suggest a definite core infarct by CT perfusion criteria. MRI of   the brain is recommended for further evaluation.      MORE TAN MD      CTA Head Neck with Contrast   Final Result   IMPRESSION:   1. No definite acute intracranial large vessel occlusion.   2. Approximately 75% stenosis of the proximal cervical right internal   carotid artery due to mixed atherosclerotic disease at the carotid   bifurcation.   3. The left vertebral artery arises directly from the aortic arch,   which is an anatomic variant. There appears to be soft plaque   resulting in severe focal stenosis of the origin of the left vertebral   artery.   4. Mild scattered intracranial atherosclerotic disease without   significant/high grade stenosis.      MORE TAN MD      CT Head w/o Contrast   Final Result   IMPRESSION:   1. No definite CT findings of acute extended infarct or intracranial   hemorrhage. ASPECTS score = 10.   2. Small area of encephalomalacia involving the right middle frontal   gyrus, which may be related to a chronic infarct.   3. Global brain parenchymal volume loss and presumed chronic small   vessel ischemic disease, " as described.      The findings from the noncontrast head CT and CT angiogram of the head   and neck were discussed by myself with the ordering provider ALESSANDRA Nava at approximately 2:45 PM on 8/15/2020.      MORE TAN MD         Abnormal values below only. See results review for all others.   Labs Ordered and Resulted from Time of ED Arrival Up to the Time of Departure from the ED   BASIC METABOLIC PANEL - Abnormal; Notable for the following components:       Result Value    Anion Gap 2 (*)     Glucose 132 (*)     All other components within normal limits   GLUCOSE MONITOR NURSING POCT   CBC WITH PLATELETS DIFFERENTIAL   INR   PARTIAL THROMBOPLASTIN TIME   TROPONIN I   COVID-19 VIRUS (CORONAVIRUS) BY PCR - pending     EKG  Indication: TIA  Time: 1440  Rate 61 bpm. ID interval 150. QRS duration 90. QT/QTc 424/426.   Normal sinus rhythm  Normal ECG  No acute ST changes.  No prior for comparison.    ED course:  Yoel symptoms had resolved by the time I evaluated him.  However, code stroke was called and he was seen by vascular neurology immediately then sent to CT.  Fortunately, there is no intracranial hemorrhage.  There is no obvious ischemic stroke with 75% stenosis of the proximal cervical right ICA resulting in relative prolongation of the contrast transit time on CT perfusion.  Basic laboratory studies are unremarkable.  His initial markedly elevated blood pressure of 200s systolic improved without intervention.  After consultation with vascular neurology, who discussed risks and benefits of TPA with the patient, he elected to proceed with this.  He did quite well and required no further interventions.  He will require admission to an ICU for close monitoring after TPA.  Unfortunately, there are no beds in the [place] system so he will be transferred to [place]. Please see LINUS Nava's note for further details.    Diagnosis:   1. TIA s/p tPA  2.  Elevated blood pressure readings without diagnosis of  hypertension    MD Genna Mora Kylie S, MD  08/15/20 7335

## 2020-08-15 NOTE — ED NOTES
Labetalol not given d/t BP reading lowered. Per MD - BP parameters should be less than 185/110. Will continue to monitor.

## 2020-08-15 NOTE — PHARMACY
Pharmacy Medication History  Admission medication history interview status for the 8/15/2020  admission is complete. See EPIC admission navigator for prior to admission medications     Medication history sources: Patient's family/friend (wife), Surescripts and Care Everywhere  Medication history source reliability: Good  Adherence assessment: Moderate    Additional medication history information:   Of note: Yoel recently ran out of tadalafil and has not had a dose this past week.  He takes it for BPH.    Medication reconciliation completed by provider prior to medication history? No    Time spent in this activity: 10 minutes      Prior to Admission medications    Medication Sig Last Dose Taking? Auth Provider   acetaminophen (TYLENOL) 325 MG tablet Take 650 mg by mouth every 6 hours as needed for mild pain  Yes Unknown, Entered By History   multivitamin  with lutein (OCUVITE WITH LTEIN) CAPS per capsule Take 1 capsule by mouth daily 8/15/2020 at Unknown time Yes Unknown, Entered By History   tadalafil (CIALIS) 5 MG tablet Take 1 tablet (5 mg) by mouth every 24 hours Past Week at Unknown time Yes Calin Courtney MD

## 2020-08-15 NOTE — CONSULTS
[facility]    Stroke Consult Note    Reason for Consult: Stroke Code    Chief Complaint: One-sided Weakness      HPI  Yoel Stevenson is a 80 year old male with no past medical history was brought to the ER by wife for speech changes and left hand weakness. According to the wife the patient had slurred speech and difficulty coming up with words while at lunch. His Last known well was at 1350. He did mention that he was having difficulty using his left hand and it felt numb. His symptoms started resolving and by the time he presented to ER his symptoms had resolved completely. He denies any such episodes in the past as well.On arrival to ER his blood pressure was elevated. CT was negative and CTA showed bilateral carotid stenosis with R worse greater than 70%. On exam patient was noted to have slight decreased finger taping and pronator drift on the LUE.    TPA Treatment   Administered. Risks and benefits of tPA were discussed with Patient and Family prior to administration.tPA was given at 1510. Due to patients subtle deficit of LUE pronator drift he was considered a candidate for tPA. These findings were communicated and discussed with patient and his wife. They were informed of complication risk and potential benefit. They were informed about the current deficits appear non disabling. They were offered treatment in the light if there is worsening of his stroke. They decided to proceed with the treatment.    Endovascular Treatment  Not initiated due to absence of proximal vessel occlusion    Impression  Ischemic Stroke due to large-artery atherosclerosis (embolus/thrombosis)    Recommendations  Acute Ischemic Stroke (post tPA) Recommendations  - Close monitoring and neurochecks per post-tPA orders for any evidence of hemorrhagic transformation or allergic reaction  - Labetalol PRN to maintain BP < 180/105  - Hold all anti-thrombotic and anti-coagulant medications for 24 hrs post-IV Alteplase  "(tPA)  - Hold pharmacologic VTE prophylaxis for 24 hrs post-IV Alteplase (tPA)  - Statin:  Lipitor 80 mg daily  - Repeat HCT 24 hrs post-tPA  - MRI Stroke Protocol  - Telemetry, EKG  - Bedside Glucose Monitoring  - A1c, Lipid Panel, Troponin x 3  - PT/OT/SLP  - PM&R  - Stroke Education  - Euthermia, Euglycemia   - Given R ICA > 70% stenosis likely represent symptomatic carotid stenosis and will benefit from carotid revascularization procedure.   -Long term BP goal <140/90  - Longer term LDL goal 40-70.         Thank you for this consult. We will admit this patient to the Stroke Service.    Carito Villegas MD  Vascular Neurology  To page me or covering stroke neurology team member, click here: [facility]COM   Choose \"On Call\" tab at top, then search dropdown box for \"Neurology Adult\", select location, press Enter, then look for stroke/neuro ICU/telestroke.    ______________________________________________________    Past Medical History   Past Medical History:   Diagnosis Date     BPH with urinary obstruction      Past Surgical History   Past Surgical History:   Procedure Laterality Date     APPENDECTOMY OPEN       HEMORRHOIDECTOMY       HERNIA REPAIR, INGUINAL RT/LT       LAPAROSCOPIC CHOLECYSTECTOMY       left total knee replacement       left wrist tendon laceration repair       MOHS MICROGRAPHIC PROCEDURE       right total knee replacement       VASECTOMY       Medications   Home Meds  Prior to Admission medications    Medication Sig Start Date End Date Taking? Authorizing Provider   tadalafil (CIALIS) 5 MG tablet Take 1 tablet (5 mg) by mouth every 24 hours 1/21/20   Calin Courtney MD       Scheduled Meds    sodium chloride 0.9%  500 mL Intravenous Once     labetalol  10 mg Intravenous Once       Infusion Meds      PRN Meds      Allergies   No Known Allergies  Family History   Family History   Problem Relation Age of Onset     Hypertension Mother      Colon Cancer Sister      Prostate Cancer Brother  "     Diabetes No family hx of      Coronary Artery Disease No family hx of      Cerebrovascular Disease No family hx of      Social History   Social History     Tobacco Use     Smoking status: Never Smoker     Smokeless tobacco: Never Used   Substance Use Topics     Alcohol use: Yes     Drinks per session: 1 or 2     Drug use: Never       Review of Systems   The 10 point Review of Systems is negative other than noted in the HPI or here.        PHYSICAL EXAMINATION  Temp:  [97.6  F (36.4  C)] 97.6  F (36.4  C)  Pulse:  [62] 62  Resp:  [16] 16  BP: (203)/(108) 203/108  SpO2:  [98 %] 98 %     Neurologic  Mental Status:  alert, oriented x 3, follows commands, speech clear and fluent, naming and repetition normal  Cranial Nerves:  visual fields intact, PERRL, EOMI with normal smooth pursuit, facial sensation intact and symmetric, facial movements symmetric, hearing not formally tested but intact to conversation, palate elevation symmetric and uvula midline, no dysarthria, shoulder shrug strong bilaterally, tongue protrusion midline  Motor:  normal muscle tone and bulk, no abnormal movements, able to move all limbs spontaneously, strength 5/5 throughout upper and lower extremities, no pronator drift  Reflexes:  toes down-going  Sensory:  light touch sensation intact and symmetric throughout upper and lower extremities, no extinction on double simultaneous stimulation   Coordination:  normal finger-to-nose and heel-to-shin bilaterally without dysmetria, rapid alternating movements symmetric  Station/Gait:  deferred      Dysphagia Screen  Per Nursing    Stroke Scales    NIHSS  Interval     Interval Comments     1a. Level of Consciousness 0-->Alert, keenly responsive   1b. LOC Questions 0-->Answers both questions correctly   1c. LOC Commands 0-->Performs both tasks correctly   2.   Best Gaze 0-->Normal   3.   Visual 0-->No visual loss   4.   Facial Palsy 0-->Normal symmetrical movements   5a. Motor Arm, Left 0-->No drift,  limb holds 90 (or 45) degrees for full 10 secs   5b. Motor Arm, Right 0-->No drift, limb holds 90 (or 45) degrees for full 10 secs   6a. Motor Leg, Left 0-->No drift, leg holds 30 degree position for full 5 secs   6b. Motor Leg, right 0-->No drift, leg holds 30 degree position for full 5 secs   7.   Limb Ataxia 0-->Absent   8.   Sensory 0-->Normal, no sensory loss   9.   Best Language 0-->No aphasia, normal   10. Dysarthria 0-->Normal   11. Extinction and Inattention  0-->No abnormality   Total 0 (08/15/20 1434)       Imaging  I personally reviewed all imaging; relevant findings per HPI.     Lab Results Data   CBC  Recent Labs   Lab 08/15/20  1418   WBC 6.3   RBC 4.67   HGB 13.9   HCT 42.5        Basic Metabolic Panel    No results for input(s): NA, POTASSIUM, CHLORIDE, CO2, BUN, CR, GLC, GARY in the last 168 hours.  Liver Panel  No results for input(s): PROTTOTAL, ALBUMIN, BILITOTAL, ALKPHOS, AST, ALT, BILIDIRECT in the last 168 hours.  INR  No lab results found.   Lipid Profile    Recent Labs   Lab Test 07/30/19  1022   CHOL 195   HDL 43   *   TRIG 95     A1C  No lab results found.  Troponin I  No results for input(s): TROPI in the last 168 hours.       Stroke Code / Stroke Consult Data Data   Stroke Code Data  (for stroke code without tele)  Stroke code activated 08/15/20   1405   First stroke provider response 08/15/20   1406   Last known normal 08/15/20   1350   Time of discovery   (or onset of symptoms) 08/15/20   1350   Head CT read by me 08/15/20   1434   Was stroke code de-escalated?                  I have personally spent a total of 55 minutes providing critical care services supervising this patient's stroke code activation.  I personally reviewed all lab values and radiology images.  I evaluated and directed care for the patient's critical condition.

## 2020-08-15 NOTE — ED NOTES
"[facility]  ED Nurse Handoff Report    ED Chief complaint: One-sided Weakness      ED Diagnosis:   Final diagnoses:   None       Code Status: to be discussed with admitting hospitalist    Allergies: No Known Allergies    Patient Story: patient was having lunch with family when started to experience expressive aphagia at 1350 08/15/2020. Wife drove patient to ED - pt's speech was intact, a&Ox4, however, some left sided weakness present in arm and leg. Code stroke called.  Focused Assessment:  Left sided weakness. HTN, low resting heart rate    Treatments and/or interventions provided: IVF, meds, EKG, imaging, per plan of care ED  Patient's response to treatments and/or interventions: tolerating    To be done/followed up on inpatient unit: NA    Does this patient have any cognitive concerns?: NA    Activity level - Baseline/Home:  Independent  Activity Level - Current:   Stand with Assist    Patient's Preferred language: English   Needed?: No    Isolation: None  Infection: Not Applicable  Bariatric?: No    Vital Signs:   Vitals:    08/15/20 1408 08/15/20 1410 08/15/20 1415 08/15/20 1445   BP: (!) 203/108 (!) 185/98 (!) 160/77 (!) 170/81   Pulse: 62 64 54 58   Resp: 16 20 20 22   Temp: 97.6  F (36.4  C)      TempSrc: Temporal      SpO2: 98% 97% 99% 97%   Weight: 89.4 kg (197 lb)      Height: 1.803 m (5' 11\")          Cardiac Rhythm:     Was the PSS-3 completed:   Yes  What interventions are required if any?               Family Comments: wife at bedside  OBS brochure/video discussed/provided to patient/family: No              Name of person given brochure if not patient: NA              Relationship to patient: NA    For the majority of the shift this patient's behavior was Green.   Behavioral interventions performed were NA.    ED NURSE PHONE NUMBER: [phone]         "

## 2020-08-15 NOTE — ED PROVIDER NOTES
History     Chief Complaint:  One-sided Weakness       The history is provided by the patient and the spouse.      Yoel Stevenson is a 80 year old male who presents for evaluation of one-sided weakness. The patient reports he was eating a meal when he developed sudden onset aphasia at 1350 lasting 10 minutes. Per wife, the patient was speaking and acting normally prior to symptom onset and then developed slurred speech and could not get his words out. The patient also notes left hand weakness and numbness, which lasted about 30 minutes but has now resolved. The patient's wife is unable to remember if she noticed any facial drooping. The patient was able to ambulate independently to the car prior to arrival to the emergency department. No history of diabetes or hypertension.     Allergies:  No Known Drug Allergies    Medications:   Cialis    Past Medical History:    BHP with urinary obstruction     Past Surgical History:    Appendectomy   Hemorrhoidectomy   Hernia repair, inguinal  Laparoscopic cholecystectomy   Left total knee replacement   Left wrist tendon laceration repair   MOHS micrographic procedure  Right total knee replacement   Vasectomy      Family History:    Mother: hypertension  Sister: colon cancer  Brother: prostate cancer    Social History:  The patient presents accompanied to the ED by his wife.  PCP: Calin Courtney  Smoking status: Never Smoker  Smokeless tobacco: Never Used  Alcohol use: Positive  Drug use: Negative    Review of Systems   Reason unable to perform ROS: supplmented by wife.   Neurological: Positive for speech difficulty, weakness (left side) and numbness (left hand). Negative for facial asymmetry (unsure).   All other systems reviewed and are negative.    Physical Exam     Patient Vitals for the past 24 hrs:   BP Temp Temp src Pulse Heart Rate Resp SpO2 Height Weight   08/15/20 1739 (!) 162/104 -- -- 59 -- -- 98 % -- --   08/15/20 1715 (!) 143/84 -- -- 52 53 --  "-- -- --   08/15/20 1700 (!) 152/102 -- -- 70 54 10 -- -- --   08/15/20 1645 (!) 154/93 -- -- 54 54 (!) 7 -- -- --   08/15/20 1630 (!) 158/96 -- -- 54 54 12 -- -- --   08/15/20 1615 (!) 145/84 -- -- 53 53 19 97 % -- --   08/15/20 1600 (!) 165/97 -- -- 53 53 28 97 % -- --   08/15/20 1545 (!) 158/95 -- -- 52 53 26 97 % -- --   08/15/20 1530 (!) 154/88 -- -- 54 54 26 98 % -- --   08/15/20 1515 (!) 164/90 -- -- 56 56 (!) 6 98 % -- --   08/15/20 1500 (!) 162/83 -- -- 58 57 -- 97 % -- --   08/15/20 1445 (!) 170/81 -- -- 58 -- 22 97 % -- --   08/15/20 1415 (!) 160/77 -- -- 54 -- 20 99 % -- --   08/15/20 1410 (!) 185/98 -- -- 64 -- 20 97 % -- --   08/15/20 1408 (!) 203/108 97.6  F (36.4  C) Temporal 62 -- 16 98 % 1.803 m (5' 11\") 89.4 kg (197 lb)       Physical Exam   General: Non-toxic appearing.   Skin: Good turgor, no rash, no unusual bruising or prominent lesions.  HEENT: Head: Normocephalic, atraumatic, no visible or palpable masses, depressions, or scarring.  Eyes: Conjunctiva clear, sclera non-icteric, EOM intact, PERRL.   Throat/pharynx: Mucous membranes moist, no mucosal lesions. Mucosa non-inflamed, no tonsillar hypertrophy or exudate.   Neck: Supple, without lesions or adenopathy.  Cardiac: Normal rate and regular rhythm, no murmur or gallop.   Lungs: Clear to auscultation and percussion   Abdomen: Bowel sounds normal, no tenderness, organomegaly, masses, or hernia. No guarding or rebound tenderness.   Musculoskeletal: Normal gait and station. Full strength in upper and lower extremities.   Neurologic: Alert and oriented x3. GCS 15. CN II-VII intact. Normal finger to nose, rapid hand movements, heel to shin.  Mild left-handed pronator drift.  Psychiatric: Intact recent and remote memory, judgment and insight, normal mood and affect.     National Institutes of Health Stroke Scale  Exam Interval: Baseline   Score    Level of consciousness: (0)   Alert, keenly responsive    LOC questions: (0)   Answers both " questions correctly    LOC commands: (0)   Performs both tasks correctly    Best gaze: (0)   Normal    Visual: (0)   No visual loss    Facial palsy: (0)   Normal symmetrical movements    Motor arm (left): (1)   Drift    Motor arm (right): (0)   No drift    Motor leg (left): (0)   No drift    Motor leg (right): (0)   No drift    Limb ataxia: (0)   Absent    Sensory: (0)   Normal- no sensory loss    Best language: (0)   Normal- no aphasia    Dysarthria: (0)   Normal    Extinction and inattention: (0)   No abnormality        Total Score:  1     Emergency Department Course   ECG   Time: 1442  Vent. Rate 61 bpm. SD interval 150. QRS duration 90. QT/QTc 424/426. P-R-T axis 62 57 53.  Normal sinus rhythm   Normal EKG   Read time: Read by Dr. Maxwell at 1452    Imaging:  Radiographic findings were communicated with the patient who voiced understanding of the findings.    CT Head w/o Contrast  IMPRESSION:  1. No definite CT findings of acute extended infarct or intracranial  hemorrhage. ASPECTS score = 10.  2. Small area of encephalomalacia involving the right middle frontal  gyrus, which may be related to a chronic infarct.  3. Global brain parenchymal volume loss and presumed chronic small  vessel ischemic disease, as described.     The findings from the noncontrast head CT and CT angiogram of the head  and neck were discussed by myself with the ordering provider ALESSANDRA Nava at approximately 2:45 PM on 8/15/2020. Reading per radiology.     CTA Head Neck with Contrast  IMPRESSION:  1. No definite acute intracranial large vessel occlusion.  2. Approximately 75% stenosis of the proximal cervical right internal  carotid artery due to mixed atherosclerotic disease at the carotid  bifurcation.  3. The left vertebral artery arises directly from the aortic arch,  which is an anatomic variant. There appears to be soft plaque  resulting in severe focal stenosis of the origin of the left vertebral  artery.  4. Mild scattered  intracranial atherosclerotic disease without  significant/high grade stenosis. Reading per radiology.     CT Head Perfusion w Contrast  IMPRESSION: There is relative prolongation of contrast transit time to  the right cerebral hemisphere, manifesting as increased relative  time-to-peak and Tmax in the right cerebral hemisphere. This appears  to be relatively more pronounced in the region of the right  LUCIA-MCA/MCA-PCA and internal border zones. This is likely related to  the relatively severe proximal right cervical internal carotid artery  stenosis. There is no definite focal area of low cerebral blood volume  to suggest a definite core infarct by CT perfusion criteria. MRI of  the brain is recommended for further evaluation. Reading per radiology.      Laboratory:  Laboratory findings were communicated with the patient who voiced understanding of the findings.    CBC: all WNL (WBC: 6.3, HGB 13.9, PLT: 205)  BMP: Glucose 132 (L), Anion Gap 2 (L), o/w WNL (Creatinine: 0.93)    Troponin (Collected 1418): <0.015    PTT: 30    INR: 1.12     Asymptomatic COVID-19 Virus (Coronavirus) by PCR: pending    Interventions:  1441  mL IV Bolus   1510 Alteplase 8.05 mg IV  1511 Alteplase 72.41 mg IV   1612 NS 1000 mL IV Bolus     Emergency Department Course:  Past medical records, nursing notes, and vitals reviewed.   1406 Dr. Vail and I performed an exam of the patient and obtained history, as documented above.    1409 Code Stroke called.    IV was inserted and blood was drawn for laboratory testing, results above.     The patient was sent for a CT Head, CT Head Perfusion, and CTA Head Neck while in the emergency department, results above.      1416 I spoke with Dr. Bakari Arzate, Stroke Neurology, regarding the patient after he evaluated the patient at the bedside.     1436 I spoke with Dr. Arzate, Stroke Neurology, regarding the patient.    1439 I rechecked and explained findings to the patient.    1441 IV fluids  given.    1445 I spoke with Dr. Katz, Radiology, regarding the patient.     1448 I spoke with Dr. Arzate, Stroke Neurology, regarding the patient.     1510 I rechecked and updated the patient.     1510 tPA administered.     1535 I spoke with Dr. Trevino, Intensivist at [facility], who agreed to admit the patient.     1548 I rechecked and updated the patient and his wife.     1612 IV fluids administered.     Findings and plan explained to the Patient and his wife. Patient will be transferred to [facility] via EMS. Discussed the case with Dr. Trevino, who will admit the patient to a monitored bed for further monitoring, evaluation, and treatment.     I personally reviewed the laboratory results with the Patient and answered all related questions prior to transfer.     Impression & Plan      CMS Diagnoses: The patient has stroke symptoms:         ED Stroke specific documentation           NIHSS PDF     Patient last known well time: 1350  ED Provider first to bedside at: 1406  CT Results received at: 1445    tPA:   Administered. Risks and benefits of tPA were discussed with Patient prior to administration.    If treating with tPA: Ensure SBP<185 and DBP<105 prior to treatment with IV tPA.  Administering IV tPA after treatment with IV labetalol, hydralazine, or nicardipine is reasonable once BP control is established.    Endovascular Retrieval:  Not initiated due to absence of proximal vessel occlusion    National Institutes of Health Stroke Scale (Baseline)  Time Performed: 1406     Score    Level of consciousness: (0)   Alert, keenly responsive    LOC questions: (0)   Answers both questions correctly    LOC commands: (0)   Performs both tasks correctly    Best gaze: (0)   Normal    Visual: (0)   No visual loss    Facial palsy: (0)   Normal symmetrical movements    Motor arm (left): (1)   Drift    Motor arm (right): (0)   No drift    Motor leg (left): (0)   No drift    Motor leg (right):  (0)   No drift    Limb ataxia: (0)   Absent    Sensory: (0)   Normal- no sensory loss    Best language: (0)   Normal- no aphasia    Dysarthria: (0)   Normal    Extinction and inattention: (0)   No abnormality        Total Score:  1        Stroke Mimics were considered (including migraine headache, seizure disorder, hypoglycemia (or hyperglycemia), head or spinal trauma, CNS infection, Toxin ingestion and shock state (e.g. sepsis) .    Covid-19  Yoel Stevenson was evaluated during a global COVID-19 pandemic, which necessitated consideration that the patient might be at risk for infection with the SARS-CoV-2 virus that causes COVID-19.   Applicable protocols for evaluation were followed during the patient's care.   COVID-19 was considered as part of the patient's evaluation. The plan for testing is:  a test was obtained during this visit.        Medical Decision Making:  Yoel Stevenson is a 80 year old male who presents for evaluation of expressive aphasia and left hand numbness and weakness starting at 150pm this afternoon.  Details of the patient's history can be known the HPI.  Differentials considered included CVA, TIA, dissection, cerebral tumor, migraine, infection, metabolic derangement, demyelination, amongst others.  On initial exam, patient had mild left hand pronator drift, remaining exam within normal limits.  Imaging results were obtained as noted above, showing no large vessel occlusion or obvious hemorrhage.  Patient is a candidate for TPA.  Stroke neurology team presented very quickly here in the emergency department and also evaluated the patient.  Risk benefits of initiating TPA were discussed, and patient was started on TPA at 1510.     Remaining labs within normal limits.  ECG shows no signs of atrial fibrillation or other arrhythmia.  He has been continuously monitored here on cardiac telemetry.  Mild improvement of left hand pronator drift.  Initially, consulted with stroke neurology team  here as well as hospitalist staff.  However, there are no ICU beds available, which is needed after TPA initiation.  I then consulted with staff at [facility], the intensivist accepted the patient to the ICU at this facility.  He continued to look well throughout his ED stay.  Blood pressures were initially mildly elevated, labetalol ordered, but was not initiated as they returned to appropriate levels without additional interventions.  Patient was transferred to [facility] via EMS.  All the findings as noted above are discussed in great detail with the patient and his wife.  All questions answered.  They are in agreement with the treatment plan as stated above.    Critical Care time:  was 30 minutes for this patient excluding procedures.    Diagnosis:    ICD-10-CM    1. Cerebrovascular accident (H)  I63.9    2. Expressive aphasia  R47.01 Asymptomatic COVID-19 Virus (Coronavirus) by PCR   3. Elevated blood pressure reading without diagnosis of hypertension  R03.0         Disposition:  Transferred to [facility].      Scribe Disclosure:  I, Becka Forbes, am serving as a scribe at 2:10 PM on 8/15/2020 to document services personally performed by Nazia Nava PA based on my observations and the provider's statements to me.     This was created at least in part with a voice recognition software. Mistakes/typos may be present.      8/15/2020   Nazia Kowalski PA  08/15/20 [id number]

## 2020-08-15 NOTE — ED TRIAGE NOTES
Pt was having dinner;  Sudden onset of left sided weakness;  Facial droop and wife states that he had trouble finding words and had garbled speech.

## 2020-08-16 LAB
CHOLEST SERPL-MCNC: 181 MG/DL (ref 100–199)
HBA1C MFR BLD: 5.5 % (ref 0–5.7)
HDLC SERPL-MCNC: 37 MG/DL
INTERPRETATION ECG - MUSE: NORMAL
LDLC SERPL CALC-MCNC: 125 MG/DL
NONHDLC SERPL-MCNC: 144 MG/DL
TRIGL SERPL-MCNC: 94 MG/DL

## 2020-08-17 LAB
SARS-COV-2 RNA SPEC QL NAA+PROBE: NOT DETECTED
SPECIMEN SOURCE: NORMAL

## 2020-08-18 LAB — INR PPP: 1.1

## 2020-08-19 LAB
CREAT SERPL-MCNC: 0.91 MG/DL (ref 0.72–1.25)
GFR SERPL CREATININE-BSD FRML MDRD: >60 ML/MIN/1.73M2
GLUCOSE SERPL-MCNC: 96 MG/DL (ref 65–100)
POTASSIUM SERPL-SCNC: 3.8 MMOL/L (ref 3.5–5)

## 2020-08-21 ENCOUNTER — OFFICE VISIT (OUTPATIENT)
Dept: FAMILY MEDICINE | Facility: CLINIC | Age: 80
End: 2020-08-21
Payer: COMMERCIAL

## 2020-08-21 VITALS
TEMPERATURE: 96.9 F | BODY MASS INDEX: 28.14 KG/M2 | HEART RATE: 71 BPM | DIASTOLIC BLOOD PRESSURE: 81 MMHG | HEIGHT: 71 IN | OXYGEN SATURATION: 97 % | WEIGHT: 201 LBS | SYSTOLIC BLOOD PRESSURE: 132 MMHG

## 2020-08-21 DIAGNOSIS — I63.131 CEREBROVASCULAR ACCIDENT (CVA) DUE TO EMBOLISM OF RIGHT CAROTID ARTERY (H): ICD-10-CM

## 2020-08-21 PROBLEM — I63.9 STROKE (H): Status: ACTIVE | Noted: 2020-08-01

## 2020-08-21 PROCEDURE — 99495 TRANSJ CARE MGMT MOD F2F 14D: CPT | Performed by: INTERNAL MEDICINE

## 2020-08-21 RX ORDER — ASPIRIN 81 MG/1
81 TABLET ORAL
COMMUNITY
Start: 2020-08-20

## 2020-08-21 RX ORDER — CLOPIDOGREL BISULFATE 75 MG/1
75 TABLET ORAL
COMMUNITY
Start: 2020-08-19 | End: 2020-09-24

## 2020-08-21 RX ORDER — ATORVASTATIN CALCIUM 40 MG/1
40 TABLET, FILM COATED ORAL
COMMUNITY
Start: 2020-08-19 | End: 2021-09-30

## 2020-08-21 ASSESSMENT — MIFFLIN-ST. JEOR: SCORE: 1643.86

## 2020-08-21 NOTE — PROGRESS NOTES
The patient is here for hosp follow up, just out of [facility] 2 days ago, admitted 8/15 and out 8/19/20, [facility] hosp, had acute cva with severe right carotid artery stenosis, had right cea then out.  I reviewed discontinue note on careeverywhere and labs and scans.  Patient does not need other tests or have follow up x for surgery in few weeks.  His wife is present today    Patient presented with word finding difficulty and garbled speech and left facial droop and left sided weakness.  Head ct was positive and ct angio with 75% right cervica ica and severe stenosis at origin of left vertebral artery.  The patient did get tpa and symptoms resolved.  He the was transferred to [facility] and had surgery right cea and ?shunt placement 8/18/20.  He is to be on lifelong asa and plavix per discontinue note, had lipitor started as well.  He had sinus kodak due stay which was asymptomatic, just while asleep.  Tele showed sinus kodak only.      The patient is doing super at this time.   He denies all c/o on review of systems including neuro symptoms and wife concurs.  No cv, respir, gi or genitourinary issues.  Blood pressure has been fine.    Past Medical History:   Diagnosis Date     BPH with urinary obstruction      Stroke (H) 08/2020    received tpa, then cea done [facility]     Past Surgical History:   Procedure Laterality Date     APPENDECTOMY OPEN       HEMORRHOIDECTOMY       HERNIA REPAIR, INGUINAL RT/LT       LAPAROSCOPIC CHOLECYSTECTOMY       left total knee replacement       left wrist tendon laceration repair       MOHS MICROGRAPHIC PROCEDURE       right total knee replacement       VASECTOMY       Social History     Socioeconomic History     Marital status:      Spouse name: Not on file     Number of children: Not on file     Years of education: Not on file     Highest education level: Not on file   Occupational History     Not on file   Social Needs     Financial resource strain: Not on file     Food insecurity     Worry: Not  "on file     Inability: Not on file     Transportation needs     Medical: Not on file     Non-medical: Not on file   Tobacco Use     Smoking status: Never Smoker     Smokeless tobacco: Never Used   Substance and Sexual Activity     Alcohol use: Yes     Drinks per session: 1 or 2     Comment: minimal     Drug use: Never     Sexual activity: Yes     Partners: Female   Lifestyle     Physical activity     Days per week: Not on file     Minutes per session: Not on file     Stress: Not on file   Relationships     Social connections     Talks on phone: Not on file     Gets together: Not on file     Attends Restorationist service: Not on file     Active member of club or organization: Not on file     Attends meetings of clubs or organizations: Not on file     Relationship status: Not on file     Intimate partner violence     Fear of current or ex partner: Not on file     Emotionally abused: Not on file     Physically abused: Not on file     Forced sexual activity: Not on file   Other Topics Concern     Not on file   Social History Narrative     Not on file     Current Outpatient Medications   Medication Sig Dispense Refill     acetaminophen (TYLENOL) 325 MG tablet Take 650 mg by mouth every 6 hours as needed for mild pain       aspirin 81 MG EC tablet Take 81 mg by mouth       atorvastatin (LIPITOR) 40 MG tablet Take 40 mg by mouth       clopidogrel (PLAVIX) 75 MG tablet Take 75 mg by mouth       multivitamin  with lutein (OCUVITE WITH LTEIN) CAPS per capsule Take 1 capsule by mouth daily       tadalafil (CIALIS) 5 MG tablet Take 1 tablet (5 mg) by mouth every 24 hours 90 tablet 1     No Known Allergies  FAMILY HISTORY NOTED AND REVIEWED    REVIEW OF SYSTEMS: above    PHYSICAL EXAM    /81 (BP Location: Right arm, Patient Position: Sitting, Cuff Size: Adult Regular)   Pulse 71   Temp 96.9  F (36.1  C) (Temporal)   Ht 1.803 m (5' 11\")   Wt 91.2 kg (201 lb)   SpO2 97%   BMI 28.03 kg/m      Patient appears non " toxic  Mouth and eyes within normal limits  Speech fluent  Neck within normal limits  No supraclavicular, cervical or axillary lymphadenopathy  Lungs clear, normal flow  cv reglar rate and rhythm, no murmer, rub or gallop, no jvp or edema  Abdomen normal active bowel sounds, soft, non-tender, no mgr, no hepatosplenomegaly  Neuro: alert, speech fluent, cn within normal limits, motor and reflexes within normal limits    ASSESSMENT:  cvi due to carotid dz, s/p cea and doing super.  On dapt and doing well, on statin.    PLAN:  No change in meds  Call or to emergency room if neuro symptoms  Follow up with Dr. Courtney in 4 weeks and labs then    Bebeto Dockery M.D.

## 2020-08-21 NOTE — PROGRESS NOTES
Subjective     Yoel Stevenson is a 80 year old male who presents to clinic today for the following health issues:    HPI         Hospital Follow-up Visit:    Hospital/Nursing Home/IP Rehab Facility: [facility]  Date of Admission: 08/15/2020  Date of Discharge: 08/19/2020  Reason(s) for Admission: Acute CVA (cerebrovascular accident) (HC)        Was your hospitalization related to COVID-19? No   Problems taking medications regularly:  None  Medication changes since discharge: None  Problems adhering to non-medication therapy:  None    Summary of hospitalization:  CareEverywhere information obtained and reviewed  Diagnostic Tests/Treatments reviewed.  Follow up needed: none  Other Healthcare Providers Involved in Patient s Care:         Specialist appointment - surgery few weeks  Update since discharge: improved. Post Discharge Medication Reconciliation: discharge medications reconciled, continue medications without change.  Plan of care communicated with patient and family

## 2020-08-31 ENCOUNTER — OFFICE VISIT (OUTPATIENT)
Dept: FAMILY MEDICINE | Facility: CLINIC | Age: 80
End: 2020-08-31
Payer: COMMERCIAL

## 2020-08-31 VITALS
HEART RATE: 69 BPM | DIASTOLIC BLOOD PRESSURE: 65 MMHG | BODY MASS INDEX: 28.35 KG/M2 | WEIGHT: 202.5 LBS | OXYGEN SATURATION: 97 % | HEIGHT: 71 IN | TEMPERATURE: 96.9 F | SYSTOLIC BLOOD PRESSURE: 124 MMHG

## 2020-08-31 DIAGNOSIS — Z12.5 SCREENING FOR PROSTATE CANCER: ICD-10-CM

## 2020-08-31 DIAGNOSIS — B02.8 HERPES ZOSTER WITH COMPLICATION: Primary | ICD-10-CM

## 2020-08-31 DIAGNOSIS — E53.8 VITAMIN B 12 DEFICIENCY: ICD-10-CM

## 2020-08-31 DIAGNOSIS — G62.9 NEUROPATHY: ICD-10-CM

## 2020-08-31 DIAGNOSIS — N40.1 BENIGN PROSTATIC HYPERPLASIA WITH URINARY FREQUENCY: ICD-10-CM

## 2020-08-31 DIAGNOSIS — R35.0 BENIGN PROSTATIC HYPERPLASIA WITH URINARY FREQUENCY: ICD-10-CM

## 2020-08-31 LAB
ERYTHROCYTE [DISTWIDTH] IN BLOOD BY AUTOMATED COUNT: 14.7 % (ref 10–15)
ERYTHROCYTE [SEDIMENTATION RATE] IN BLOOD BY WESTERGREN METHOD: 10 MM/H (ref 0–20)
HCT VFR BLD AUTO: 40.1 % (ref 40–53)
HGB BLD-MCNC: 13.6 G/DL (ref 13.3–17.7)
MCH RBC QN AUTO: 31.6 PG (ref 26.5–33)
MCHC RBC AUTO-ENTMCNC: 33.9 G/DL (ref 31.5–36.5)
MCV RBC AUTO: 93 FL (ref 78–100)
PLATELET # BLD AUTO: 229 10E9/L (ref 150–450)
RBC # BLD AUTO: 4.3 10E12/L (ref 4.4–5.9)
VIT B12 SERPL-MCNC: 326 PG/ML (ref 193–986)
WBC # BLD AUTO: 6.2 10E9/L (ref 4–11)

## 2020-08-31 PROCEDURE — 82607 VITAMIN B-12: CPT | Performed by: INTERNAL MEDICINE

## 2020-08-31 PROCEDURE — 99213 OFFICE O/P EST LOW 20 MIN: CPT | Performed by: INTERNAL MEDICINE

## 2020-08-31 PROCEDURE — 85652 RBC SED RATE AUTOMATED: CPT | Performed by: INTERNAL MEDICINE

## 2020-08-31 PROCEDURE — 85027 COMPLETE CBC AUTOMATED: CPT | Performed by: INTERNAL MEDICINE

## 2020-08-31 PROCEDURE — 36415 COLL VENOUS BLD VENIPUNCTURE: CPT | Performed by: INTERNAL MEDICINE

## 2020-08-31 RX ORDER — GABAPENTIN 100 MG/1
100 CAPSULE ORAL 3 TIMES DAILY
Qty: 100 CAPSULE | Refills: 1 | Status: SHIPPED | OUTPATIENT
Start: 2020-08-31 | End: 2020-09-24

## 2020-08-31 ASSESSMENT — MIFFLIN-ST. JEOR: SCORE: 1650.66

## 2020-08-31 NOTE — LETTER
[address] [facility]  [address]  [place]  [address]  Tel: [phone]    September 8, 2020    Yoel Stevenson  [address] [place] [id number]        Dear Mr. Stevenson,    Enclosed your lab reports from your recent office exam.   Your vitamin B12 level was normal.   The CBC showed that your blood cell tests were all normal, no sign of low blood or anemia.   The sedimentation rate is a sensitive indicator of generalized inflammation and your sedimentation rate was well within the normal range.     If any questions regarding these reports please let me know.       Sincerely,    Magdy Siddiqui MD    Enclosure: Lab Results  Results for orders placed or performed in visit on 08/31/20   Erythrocyte sedimentation rate auto     Status: None   Result Value Ref Range    Sed Rate 10 0 - 20 mm/h   CBC with platelets     Status: Abnormal   Result Value Ref Range    WBC 6.2 4.0 - 11.0 10e9/L    RBC Count 4.30 (L) 4.4 - 5.9 10e12/L    Hemoglobin 13.6 13.3 - 17.7 g/dL    Hematocrit 40.1 40.0 - 53.0 %    MCV 93 78 - 100 fl    MCH 31.6 26.5 - 33.0 pg    MCHC 33.9 31.5 - 36.5 g/dL    RDW 14.7 10.0 - 15.0 %    Platelet Count 229 150 - 450 10e9/L   Vitamin B12     Status: None   Result Value Ref Range    Vitamin B12 326 193 - 986 pg/mL

## 2020-08-31 NOTE — PROGRESS NOTES
"Subjective     Yoel Stevenson is a 80 year old male who presents to clinic today for the following health issues:    HPI       Shingles concerns  Right-sided facial pain similar to what he experienced in the past with his shingles and he is concerned that he has a recurrence.  Recent right carotid endarterectomy with associated normal healing.  His right-sided paresthesias are controlled with Tylenol.    The symptoms of his CVA have resolved except for residual changes in his vision which are complicated by his macular degeneration        Review of Systems   Constitutional, HEENT, cardiovascular, pulmonary, gi and gu systems are negative, except as otherwise noted.      Objective    /65 (BP Location: Right arm, Patient Position: Sitting, Cuff Size: Adult Regular)   Pulse 69   Temp 96.9  F (36.1  C) (Temporal)   Ht 1.803 m (5' 11\")   Wt 91.9 kg (202 lb 8 oz)   SpO2 97%   BMI 28.24 kg/m    Body mass index is 28.24 kg/m .  Physical Exam   His skin is clean and dry he has no facial rash and has paresthesias superior to his incision which is healing well  GENERAL: healthy, alert and no distress  NECK: no adenopathy, no asymmetry, masses,  and thyroid normal to palpation  RESP: lungs clear to auscultation - no rales, rhonchi or wheezes  CV: regular rate and rhythm, normal S1 S2, no S3 or S4, no murmur, click or rub, no peripheral edema and peripheral pulses strong  ABDOMEN: soft, nontender, no hepatosplenomegaly, no masses and bowel sounds normal  MS: no gross musculoskeletal defects noted, no edema            Assessment & Plan     Herpes zoster with complication  Previous history of herpes zoster without any sign of recurrence and the paresthesias which he is noting is most likely related to the expected superficial nerve changes associated with his recent surgery and possibly exacerbated by his underlying neuropathy    Benign prostatic hyperplasia with urinary frequency  No changes with current " "therapy    Neuropathy  Recommend using gabapentin for pain control  - gabapentin (NEURONTIN) 100 MG capsule; Take 1 capsule (100 mg) by mouth 3 times daily  - Erythrocyte sedimentation rate auto  - CBC with platelets    Vitamin B 12 deficiency    - Vitamin B12     BMI:   Estimated body mass index is 28.24 kg/m  as calculated from the following:    Height as of this encounter: 1.803 m (5' 11\").    Weight as of this encounter: 91.9 kg (202 lb 8 oz).           FUTURE APPOINTMENTS:       - Follow-up visit in 3 mo    No follow-ups on file.    Magdy Siddiqui MD  [facility]    "

## 2020-09-23 ENCOUNTER — TRANSFERRED RECORDS (OUTPATIENT)
Dept: HEALTH INFORMATION MANAGEMENT | Facility: CLINIC | Age: 80
End: 2020-09-23

## 2020-09-24 ENCOUNTER — OFFICE VISIT (OUTPATIENT)
Dept: FAMILY MEDICINE | Facility: CLINIC | Age: 80
End: 2020-09-24
Payer: COMMERCIAL

## 2020-09-24 VITALS
DIASTOLIC BLOOD PRESSURE: 81 MMHG | OXYGEN SATURATION: 97 % | HEIGHT: 71 IN | HEART RATE: 57 BPM | BODY MASS INDEX: 28.28 KG/M2 | TEMPERATURE: 96.9 F | WEIGHT: 202 LBS | SYSTOLIC BLOOD PRESSURE: 132 MMHG

## 2020-09-24 DIAGNOSIS — Z98.890 H/O CAROTID ENDARTERECTOMY: ICD-10-CM

## 2020-09-24 DIAGNOSIS — I63.311 CEREBROVASCULAR ACCIDENT (CVA) DUE TO THROMBOSIS OF RIGHT MIDDLE CEREBRAL ARTERY (H): Primary | ICD-10-CM

## 2020-09-24 LAB
ALBUMIN SERPL-MCNC: 3.4 G/DL (ref 3.4–5)
ALP SERPL-CCNC: 64 U/L (ref 40–150)
ALT SERPL W P-5'-P-CCNC: 19 U/L (ref 0–70)
ANION GAP SERPL CALCULATED.3IONS-SCNC: 3 MMOL/L (ref 3–14)
AST SERPL W P-5'-P-CCNC: 13 U/L (ref 0–45)
BILIRUB SERPL-MCNC: 0.6 MG/DL (ref 0.2–1.3)
BUN SERPL-MCNC: 13 MG/DL (ref 7–30)
CALCIUM SERPL-MCNC: 8.9 MG/DL (ref 8.5–10.1)
CHLORIDE SERPL-SCNC: 110 MMOL/L (ref 94–109)
CHOLEST SERPL-MCNC: 104 MG/DL
CO2 SERPL-SCNC: 27 MMOL/L (ref 20–32)
CREAT SERPL-MCNC: 0.94 MG/DL (ref 0.66–1.25)
GFR SERPL CREATININE-BSD FRML MDRD: 76 ML/MIN/{1.73_M2}
GLUCOSE SERPL-MCNC: 90 MG/DL (ref 70–99)
HDLC SERPL-MCNC: 41 MG/DL
LDLC SERPL CALC-MCNC: 48 MG/DL
NONHDLC SERPL-MCNC: 63 MG/DL
POTASSIUM SERPL-SCNC: 4.4 MMOL/L (ref 3.4–5.3)
PROT SERPL-MCNC: 7 G/DL (ref 6.8–8.8)
SODIUM SERPL-SCNC: 140 MMOL/L (ref 133–144)
TRIGL SERPL-MCNC: 75 MG/DL

## 2020-09-24 PROCEDURE — 83921 ORGANIC ACID SINGLE QUANT: CPT | Performed by: INTERNAL MEDICINE

## 2020-09-24 PROCEDURE — 36415 COLL VENOUS BLD VENIPUNCTURE: CPT | Performed by: INTERNAL MEDICINE

## 2020-09-24 PROCEDURE — 80053 COMPREHEN METABOLIC PANEL: CPT | Performed by: INTERNAL MEDICINE

## 2020-09-24 PROCEDURE — 99214 OFFICE O/P EST MOD 30 MIN: CPT | Performed by: INTERNAL MEDICINE

## 2020-09-24 PROCEDURE — 80061 LIPID PANEL: CPT | Performed by: INTERNAL MEDICINE

## 2020-09-24 ASSESSMENT — MIFFLIN-ST. JEOR: SCORE: 1648.4

## 2020-09-24 NOTE — PROGRESS NOTES
Subjective     Yoel Stevenson is a 80 year old male who presents to clinic today for the following health issues:    HPI 80-year-old male status post a right middle cerebral artery CVA.  The patient's presentation was garbled speech and weakness of the left hand.  He obtained TPA and had almost complete resolution of his symptoms.  The only residual deficit is a little bit of weakness with the left hand.    Patient underwent a successful CEA and currently is on dual antiplatelet therapy.  Again he is doing well.  He is active.  He is looking forward to spending the winter in [place].  Is been going down to [place] over the course of the last 25 years.    Denies any chest pain or shortness of breath.  No new muscular aches.  Had one episode of tingling of the scalp which he was suspicious may have been herpes.  No rash ever developed.    Chief Complaint   Patient presents with     Establish Care     Follow Up     Acute CVA (cerebrovascular accident) (HC)              Review of Systems   Constitutional, HEENT, cardiovascular, pulmonary, gi and gu systems are negative, except as otherwise noted.      Objective    There were no vitals taken for this visit.  There is no height or weight on file to calculate BMI.  Physical Exam   Alert patient no apparent distress extraocular movements are intact strength is symmetric bilaterally he has 5/5 strength perhaps just a slight decrease in  strength in the left hand but still would be considered 5/5    Heart regular rate and rhythm  Lungs are clear  Extremities no edema  Affect and mood appear appropriate.  His speech is fluent.  No evidence of dysarthria    No results found for this or any previous visit (from the past 24 hour(s)).        Assessment & Plan     Yoel was seen today for establish care and follow up.    Diagnoses and all orders for this visit:    Cerebrovascular accident (CVA) due to thrombosis of right middle cerebral artery (H)  -     Lipid panel reflex  "to direct LDL Fasting  -     Comprehensive metabolic panel (BMP + Alb, Alk Phos, ALT, AST, Total. Bili, TP)  -     Methylmalonic Acid    H/O carotid endarterectomy      Patient appears to be doing quite well.  No significant neurologic abnormality.  Slight diminished  strength but this is on the side days had previous surgery.  Speech is fine.    Assess his lipid panel today.  Blood pressure is reasonable.  I do notice that his most recent B12 level was 300 so I like to assess methylmalonic acid value given his neurologic symptoms in August at the cutaneous level    Return to clinic in 7 months when he returns from [place].  Cholesterol and blood pressure will be the focus of that visit.  BMI:   Estimated body mass index is 28.17 kg/m  as calculated from the following:    Height as of this encounter: 1.803 m (5' 11\").    Weight as of this encounter: 91.6 kg (202 lb).   Patient working on diet and exercise.  He is very active over the winter months as he is living in [place].  He plays golf multiple days per week.  The patient is very active outdoors.  He is a retired  and continues to help out his children with projects.        See Patient Instructions    Return in about 7 months (around 4/24/2021) for Routine preventive.    Kunal Bettencourt MD  [facility]    "

## 2020-09-24 NOTE — LETTER
October 1, 2020      Yoel Stevensno  [address]  [place] [address]        Dear ,    Your lab tests look great.  The total cholesterol level is 104 LDL is 48.  No evidence of vitamin deficiency.  Electrolytes and kidney function look good.     Best regards,      Kunal Bettencourt MD    Resulted Orders   Lipid panel reflex to direct LDL Fasting   Result Value Ref Range    Cholesterol 104 <200 mg/dL    Triglycerides 75 <150 mg/dL    HDL Cholesterol 41 >39 mg/dL    LDL Cholesterol Calculated 48 <100 mg/dL      Comment:      Desirable:       <100 mg/dl    Non HDL Cholesterol 63 <130 mg/dL   Comprehensive metabolic panel (BMP + Alb, Alk Phos, ALT, AST, Total. Bili, TP)   Result Value Ref Range    Sodium 140 133 - 144 mmol/L    Potassium 4.4 3.4 - 5.3 mmol/L    Chloride 110 (H) 94 - 109 mmol/L    Carbon Dioxide 27 20 - 32 mmol/L    Anion Gap 3 3 - 14 mmol/L    Glucose 90 70 - 99 mg/dL    Urea Nitrogen 13 7 - 30 mg/dL    Creatinine 0.94 0.66 - 1.25 mg/dL    GFR Estimate 76 >60 mL/min/[1.73_m2]      Comment:      Non  GFR Calc  Starting 12/18/2018, serum creatinine based estimated GFR (eGFR) will be   calculated using the Chronic Kidney Disease Epidemiology Collaboration   (CKD-EPI) equation.      GFR Estimate If Black 88 >60 mL/min/[1.73_m2]      Comment:       GFR Calc  Starting 12/18/2018, serum creatinine based estimated GFR (eGFR) will be   calculated using the Chronic Kidney Disease Epidemiology Collaboration   (CKD-EPI) equation.      Calcium 8.9 8.5 - 10.1 mg/dL    Bilirubin Total 0.6 0.2 - 1.3 mg/dL    Albumin 3.4 3.4 - 5.0 g/dL    Protein Total 7.0 6.8 - 8.8 g/dL    Alkaline Phosphatase 64 40 - 150 U/L    ALT 19 0 - 70 U/L    AST 13 0 - 45 U/L   Methylmalonic Acid   Result Value Ref Range    Methylmalonic Acid 0.14 0.00 - 0.40 umol/L      Comment:      This test was developed and its performance characteristics determined by the   [facility]  [facility].   It has not been cleared or approved by the FDA. The laboratory is regulated   under CLIA as qualified to perform high-complexity testing. This test is used   for clinical purposes. It should not be regarded as investigational or for   research.

## 2020-09-24 NOTE — PATIENT INSTRUCTIONS
You appear to be doing great.  Continue your current medications.  And, most importantly, continue to be active in your life.    I like to see you back when you return from [place] in the spring.    We will evaluate your cholesterol level today.  I noticed that your B12 level was slightly low in August so I like to recheck that.  If B12 levels are low person is more likely to have numbness or tingling in different areas of the body as well as poor balance.    Best regards  Kunal

## 2020-10-01 LAB — METHYLMALONATE SERPL-SCNC: 0.14 UMOL/L (ref 0–0.4)

## 2020-10-02 ENCOUNTER — TRANSFERRED RECORDS (OUTPATIENT)
Dept: HEALTH INFORMATION MANAGEMENT | Facility: CLINIC | Age: 80
End: 2020-10-02

## 2020-11-06 ENCOUNTER — TRANSFERRED RECORDS (OUTPATIENT)
Dept: HEALTH INFORMATION MANAGEMENT | Facility: CLINIC | Age: 80
End: 2020-11-06

## 2020-11-17 DIAGNOSIS — N40.1 BENIGN PROSTATIC HYPERPLASIA WITH URINARY FREQUENCY: ICD-10-CM

## 2020-11-17 DIAGNOSIS — R35.0 BENIGN PROSTATIC HYPERPLASIA WITH URINARY FREQUENCY: ICD-10-CM

## 2020-11-17 DIAGNOSIS — N52.9 ERECTILE DYSFUNCTION, UNSPECIFIED ERECTILE DYSFUNCTION TYPE: ICD-10-CM

## 2020-11-17 RX ORDER — TADALAFIL 5 MG/1
5 TABLET ORAL EVERY 24 HOURS
Qty: 90 TABLET | Refills: 1 | Status: SHIPPED | OUTPATIENT
Start: 2020-11-17 | End: 2020-12-04

## 2020-12-04 ENCOUNTER — TELEPHONE (OUTPATIENT)
Dept: FAMILY MEDICINE | Facility: CLINIC | Age: 80
End: 2020-12-04

## 2020-12-04 DIAGNOSIS — N40.1 BENIGN PROSTATIC HYPERPLASIA WITH URINARY FREQUENCY: ICD-10-CM

## 2020-12-04 DIAGNOSIS — R35.0 BENIGN PROSTATIC HYPERPLASIA WITH URINARY FREQUENCY: ICD-10-CM

## 2020-12-04 DIAGNOSIS — N52.9 ERECTILE DYSFUNCTION, UNSPECIFIED ERECTILE DYSFUNCTION TYPE: ICD-10-CM

## 2020-12-04 RX ORDER — TADALAFIL 5 MG/1
5 TABLET ORAL EVERY 24 HOURS
Qty: 90 TABLET | Refills: 1 | Status: SHIPPED | OUTPATIENT
Start: 2020-12-04 | End: 2021-01-07

## 2020-12-04 NOTE — TELEPHONE ENCOUNTER
Reason for Call:  Medication or medication refill:    Do you use a [facility]?  Name of the pharmacy and phone number for the current request:     [facility] #[id number] - [place], [place] - [address]        Name of the medication requested:   tadalafil (CIALIS) 5 MG tablet    Other request: Patient requesting that RX be sent to pharmacy shown above.    Can we leave a detailed message on this number? YES    Phone number patient can be reached at: Home number on file PH: [phone]    Best Time: Any    Call taken on 12/4/2020 at 2:43 PM by Eleni Alex

## 2020-12-11 ENCOUNTER — TRANSFERRED RECORDS (OUTPATIENT)
Dept: HEALTH INFORMATION MANAGEMENT | Facility: CLINIC | Age: 80
End: 2020-12-11

## 2021-01-07 ENCOUNTER — OFFICE VISIT (OUTPATIENT)
Dept: FAMILY MEDICINE | Facility: CLINIC | Age: 81
End: 2021-01-07
Payer: COMMERCIAL

## 2021-01-07 VITALS
OXYGEN SATURATION: 97 % | TEMPERATURE: 96.8 F | SYSTOLIC BLOOD PRESSURE: 129 MMHG | HEIGHT: 71 IN | DIASTOLIC BLOOD PRESSURE: 78 MMHG | HEART RATE: 62 BPM | WEIGHT: 200 LBS | BODY MASS INDEX: 28 KG/M2

## 2021-01-07 DIAGNOSIS — Z01.818 PREOP GENERAL PHYSICAL EXAM: Primary | ICD-10-CM

## 2021-01-07 DIAGNOSIS — H26.9 CATARACT OF BOTH EYES, UNSPECIFIED CATARACT TYPE: ICD-10-CM

## 2021-01-07 DIAGNOSIS — N40.1 BENIGN PROSTATIC HYPERPLASIA WITH URINARY FREQUENCY: ICD-10-CM

## 2021-01-07 DIAGNOSIS — N52.9 ERECTILE DYSFUNCTION, UNSPECIFIED ERECTILE DYSFUNCTION TYPE: ICD-10-CM

## 2021-01-07 DIAGNOSIS — R35.0 BENIGN PROSTATIC HYPERPLASIA WITH URINARY FREQUENCY: ICD-10-CM

## 2021-01-07 PROCEDURE — 99214 OFFICE O/P EST MOD 30 MIN: CPT | Performed by: INTERNAL MEDICINE

## 2021-01-07 RX ORDER — TADALAFIL 5 MG/1
5 TABLET ORAL EVERY 24 HOURS
Qty: 30 TABLET | Refills: 1 | Status: SHIPPED | OUTPATIENT
Start: 2021-01-07 | End: 2021-01-07

## 2021-01-07 RX ORDER — TADALAFIL 5 MG/1
5 TABLET ORAL EVERY 24 HOURS
Qty: 30 TABLET | Refills: 1 | Status: SHIPPED | OUTPATIENT
Start: 2021-01-07 | End: 2021-03-03

## 2021-01-07 ASSESSMENT — MIFFLIN-ST. JEOR: SCORE: 1639.32

## 2021-01-07 NOTE — PROGRESS NOTES
[address] [address]  Phone: [phone]  Primary Provider: Raciel Brizuela  Pre-op Performing Provider: RACIEL BRIZUELA    PREOPERATIVE EVALUATION:  Today's date: 1/7/2021    Yoel Stevenson is a 80 year old male who presents for a preoperative evaluation.    Surgical Information:  Surgery/Procedure: Cataracts Left 1-14-21 then right 1-28-21   Surgery Location: [facility]  Surgeon: Dr. Héctor Lester   Surgery Date: Left 1-14-21 then right 1-28-21     Time of Surgery:   Where patient plans to recover: At home with family  Fax number for surgical facility: [phone]    Type of Anesthesia Anticipated: Local    Subjective     HPI related to upcoming procedure: This is a patient with macular degeneration and bilateral cataracts.  He is presenting prior to left cataract surgery to be performed 1/14/2021.  Is because of diminished vision but he is undergoing this procedure.  He is going to have a second procedure on the right eye on 1/28/2021.    He is doing well.  No weakness.  He has no chest pain or no shortness of breath.  He tolerates anesthesia without any difficulty historically.  No bleeding issues.    Preop Questions 1/7/2021   1. Have you ever had a heart attack or stroke? YES -status post TPA   2. Have you ever had surgery on your heart or blood vessels, such as a stent placement, a coronary artery bypass, or surgery on an artery in your head, neck, heart, or legs? YES -    3. Do you have chest pain with activity? No   4. Do you have a history of  heart failure? No   5. Do you currently have a cold, bronchitis or symptoms of other infection? No   6. Do you have a cough, shortness of breath, or wheezing? No   7. Do you or anyone in your family have previous history of blood clots? No   8. Do you or does anyone in your family have a serious bleeding problem such as prolonged bleeding following surgeries or cuts? No   9. Have you ever had  problems with anemia or been told to take iron pills? No   10. Have you had any abnormal blood loss such as black, tarry or bloody stools? No   11. Have you ever had a blood transfusion? No   12. Are you willing to have a blood transfusion if it is medically needed before, during, or after your surgery? Yes   13. Have you or any of your relatives ever had problems with anesthesia? No   14. Do you have sleep apnea, excessive snoring or daytime drowsiness? No   15. Do you have any artifical heart valves or other implanted medical devices like a pacemaker, defibrillator, or continuous glucose monitor? No   16. Do you have artificial joints? YES -    17. Are you allergic to latex? No       Health Care Directive:  Patient has a Health Care Directive on file      Preoperative Review of :   reviewed - no record of controlled substances prescribed.      Status of Chronic Conditions:  See problem list for active medical problems.  Problems all longstanding and stable, except as noted/documented.  See ROS for pertinent symptoms related to these conditions.      Review of Systems  CONSTITUTIONAL: NEGATIVE for fever, chills, change in weight  ENT/MOUTH: NEGATIVE for ear, mouth and throat problems  RESP: NEGATIVE for significant cough or SOB  CV: NEGATIVE for chest pain, palpitations or peripheral edema    Patient Active Problem List    Diagnosis Date Noted     Stroke (H) 08/2020     Priority: Medium     received tpa, then cea done anw        Past Medical History:   Diagnosis Date     BPH with urinary obstruction      Stroke (H) 08/2020    received tpa, then cea done anw     Past Surgical History:   Procedure Laterality Date     APPENDECTOMY OPEN       HEMORRHOIDECTOMY       HERNIA REPAIR, INGUINAL RT/LT       LAPAROSCOPIC CHOLECYSTECTOMY       left total knee replacement       left wrist tendon laceration repair       MOHS MICROGRAPHIC PROCEDURE       right total knee replacement       VASECTOMY       Current Outpatient  "Medications   Medication Sig Dispense Refill     acetaminophen (TYLENOL) 325 MG tablet Take 650 mg by mouth every 6 hours as needed for mild pain       aspirin 81 MG EC tablet Take 81 mg by mouth       atorvastatin (LIPITOR) 40 MG tablet Take 40 mg by mouth       multivitamin  with lutein (OCUVITE WITH LTEIN) CAPS per capsule Take 1 capsule by mouth daily       tadalafil (CIALIS) 5 MG tablet Take 1 tablet (5 mg) by mouth every 24 hours (Patient not taking: Reported on 1/7/2021) 90 tablet 1       Allergies   Allergen Reactions     No Known Allergies         Social History     Tobacco Use     Smoking status: Never Smoker     Smokeless tobacco: Never Used   Substance Use Topics     Alcohol use: Yes     Drinks per session: 1 or 2     Comment: minimal     Family History   Problem Relation Age of Onset     Hypertension Mother      Colon Cancer Sister      Prostate Cancer Brother      Diabetes No family hx of      Coronary Artery Disease No family hx of      Cerebrovascular Disease No family hx of      History   Drug Use Unknown         Objective     /78 (BP Location: Left arm, Patient Position: Chair, Cuff Size: Adult Regular)   Pulse 62   Temp 96.8  F (36  C) (Temporal)   Ht 1.803 m (5' 11\")   Wt 90.7 kg (200 lb)   SpO2 97%   BMI 27.89 kg/m      Physical Exam      Recent Labs   Lab Test 09/24/20  0939 08/31/20  1441 08/19/20 08/18/20 08/16/20 08/15/20  1418   HGB  --  13.6  --   --   --  13.9   PLT  --  229  --   --   --  205   INR  --   --   --  1.1  --  1.12     --   --   --   --  140   POTASSIUM 4.4  --  3.8  --   --  4.0   CR 0.94  --  0.91  --   --  0.93   A1C  --   --   --   --  5.5  --         Diagnostics:  No labs were ordered during this visit.   No EKG required for low risk surgery (cataract, skin procedure, breast biopsy, etc).    Revised Cardiac Risk Index (RCRI):  The patient has the following serious cardiovascular risks for perioperative complications:   - No serious cardiac risks = 0 " points     RCRI Interpretation: 0 points: Class I (very low risk - 0.4% complication rate)             Assessment & Plan   The proposed surgical procedure is considered LOW risk.    There are no diagnoses linked to this encounter.       Risks and Recommendations:  The patient has the following additional risks and recommendations for perioperative complications:   - No identified additional risk factors other than previously addressed    Patient will hold morning medications the day of surgery    RECOMMENDATION:  APPROVAL GIVEN to proceed with proposed procedure, without further diagnostic evaluation.    Signed Electronically by: Kunal Bettencourt MD    Copy of this evaluation report is provided to requesting physician.    [facility] Pre[facility] Guidelines    Revised Cardiac Risk Index

## 2021-02-10 ENCOUNTER — IMMUNIZATION (OUTPATIENT)
Dept: NURSING | Facility: CLINIC | Age: 81
End: 2021-02-10
Payer: COMMERCIAL

## 2021-02-10 PROCEDURE — 0001A PR COVID VAC PFIZER DIL RECON 30 MCG/0.3 ML IM: CPT

## 2021-02-10 PROCEDURE — 91300 PR COVID VAC PFIZER DIL RECON 30 MCG/0.3 ML IM: CPT

## 2021-03-02 DIAGNOSIS — N52.9 ERECTILE DYSFUNCTION, UNSPECIFIED ERECTILE DYSFUNCTION TYPE: ICD-10-CM

## 2021-03-02 DIAGNOSIS — R35.0 BENIGN PROSTATIC HYPERPLASIA WITH URINARY FREQUENCY: ICD-10-CM

## 2021-03-02 DIAGNOSIS — N40.1 BENIGN PROSTATIC HYPERPLASIA WITH URINARY FREQUENCY: ICD-10-CM

## 2021-03-03 ENCOUNTER — IMMUNIZATION (OUTPATIENT)
Dept: NURSING | Facility: CLINIC | Age: 81
End: 2021-03-03
Attending: INTERNAL MEDICINE
Payer: COMMERCIAL

## 2021-03-03 PROCEDURE — 0002A PR COVID VAC PFIZER DIL RECON 30 MCG/0.3 ML IM: CPT

## 2021-03-03 PROCEDURE — 91300 PR COVID VAC PFIZER DIL RECON 30 MCG/0.3 ML IM: CPT

## 2021-03-03 RX ORDER — TADALAFIL 5 MG/1
TABLET ORAL
Qty: 30 TABLET | Refills: 1 | Status: SHIPPED | OUTPATIENT
Start: 2021-03-03 | End: 2021-06-08

## 2021-03-03 NOTE — TELEPHONE ENCOUNTER
"To PCP for review and sign of \"daily Tadalafil 5 mg\".  ? Change qty.and no.of refills.  Not on RN refill protocol for daily use.  Thank you,  Emily Crespo RN on 3/3/2021 at 3:41 PM    Benign prostatic hyperplasia with urinary frequency [N40.1, R35.0]  - Primary       Erectile dysfunction, unspecified erectile dysfunction type [N52.9]       COPIED FROM PREVIOUS REFILL.  THANK YOU.    "

## 2021-03-09 ENCOUNTER — TRANSFERRED RECORDS (OUTPATIENT)
Dept: HEALTH INFORMATION MANAGEMENT | Facility: CLINIC | Age: 81
End: 2021-03-09

## 2021-06-07 DIAGNOSIS — N40.1 BENIGN PROSTATIC HYPERPLASIA WITH URINARY FREQUENCY: ICD-10-CM

## 2021-06-07 DIAGNOSIS — R35.0 BENIGN PROSTATIC HYPERPLASIA WITH URINARY FREQUENCY: ICD-10-CM

## 2021-06-07 DIAGNOSIS — N52.9 ERECTILE DYSFUNCTION, UNSPECIFIED ERECTILE DYSFUNCTION TYPE: ICD-10-CM

## 2021-06-08 RX ORDER — TADALAFIL 5 MG/1
TABLET ORAL
Qty: 30 TABLET | Refills: 1 | Status: SHIPPED | OUTPATIENT
Start: 2021-06-08 | End: 2021-08-25

## 2021-09-13 ENCOUNTER — TRANSFERRED RECORDS (OUTPATIENT)
Dept: HEALTH INFORMATION MANAGEMENT | Facility: CLINIC | Age: 81
End: 2021-09-13

## 2021-09-30 ENCOUNTER — OFFICE VISIT (OUTPATIENT)
Dept: FAMILY MEDICINE | Facility: CLINIC | Age: 81
End: 2021-09-30
Payer: COMMERCIAL

## 2021-09-30 VITALS
RESPIRATION RATE: 18 BRPM | SYSTOLIC BLOOD PRESSURE: 129 MMHG | HEIGHT: 71 IN | HEART RATE: 56 BPM | OXYGEN SATURATION: 98 % | TEMPERATURE: 96.7 F | DIASTOLIC BLOOD PRESSURE: 78 MMHG | BODY MASS INDEX: 27.86 KG/M2 | WEIGHT: 199 LBS

## 2021-09-30 DIAGNOSIS — Z12.5 ENCOUNTER FOR SCREENING FOR MALIGNANT NEOPLASM OF PROSTATE: ICD-10-CM

## 2021-09-30 DIAGNOSIS — N40.1 BENIGN PROSTATIC HYPERPLASIA WITH URINARY FREQUENCY: ICD-10-CM

## 2021-09-30 DIAGNOSIS — Z00.00 ENCOUNTER FOR SCREENING AND PREVENTATIVE CARE: Primary | ICD-10-CM

## 2021-09-30 DIAGNOSIS — E78.5 HYPERLIPIDEMIA LDL GOAL <100: ICD-10-CM

## 2021-09-30 DIAGNOSIS — R35.0 BENIGN PROSTATIC HYPERPLASIA WITH URINARY FREQUENCY: ICD-10-CM

## 2021-09-30 LAB
BASOPHILS # BLD AUTO: 0 10E3/UL (ref 0–0.2)
BASOPHILS NFR BLD AUTO: 0 %
EOSINOPHIL # BLD AUTO: 0 10E3/UL (ref 0–0.7)
EOSINOPHIL NFR BLD AUTO: 1 %
ERYTHROCYTE [DISTWIDTH] IN BLOOD BY AUTOMATED COUNT: 15 % (ref 10–15)
HCT VFR BLD AUTO: 41.9 % (ref 40–53)
HGB BLD-MCNC: 14.1 G/DL (ref 13.3–17.7)
LYMPHOCYTES # BLD AUTO: 1.4 10E3/UL (ref 0.8–5.3)
LYMPHOCYTES NFR BLD AUTO: 29 %
MCH RBC QN AUTO: 31.3 PG (ref 26.5–33)
MCHC RBC AUTO-ENTMCNC: 33.7 G/DL (ref 31.5–36.5)
MCV RBC AUTO: 93 FL (ref 78–100)
MONOCYTES # BLD AUTO: 0.6 10E3/UL (ref 0–1.3)
MONOCYTES NFR BLD AUTO: 13 %
NEUTROPHILS # BLD AUTO: 2.8 10E3/UL (ref 1.6–8.3)
NEUTROPHILS NFR BLD AUTO: 57 %
PLATELET # BLD AUTO: 183 10E3/UL (ref 150–450)
RBC # BLD AUTO: 4.51 10E6/UL (ref 4.4–5.9)
WBC # BLD AUTO: 4.8 10E3/UL (ref 4–11)

## 2021-09-30 PROCEDURE — 84153 ASSAY OF PSA TOTAL: CPT | Performed by: INTERNAL MEDICINE

## 2021-09-30 PROCEDURE — 80053 COMPREHEN METABOLIC PANEL: CPT | Performed by: INTERNAL MEDICINE

## 2021-09-30 PROCEDURE — 36415 COLL VENOUS BLD VENIPUNCTURE: CPT | Performed by: INTERNAL MEDICINE

## 2021-09-30 PROCEDURE — 85025 COMPLETE CBC W/AUTO DIFF WBC: CPT | Performed by: INTERNAL MEDICINE

## 2021-09-30 PROCEDURE — 99397 PER PM REEVAL EST PAT 65+ YR: CPT | Performed by: INTERNAL MEDICINE

## 2021-09-30 PROCEDURE — 84443 ASSAY THYROID STIM HORMONE: CPT | Performed by: INTERNAL MEDICINE

## 2021-09-30 PROCEDURE — 80061 LIPID PANEL: CPT | Performed by: INTERNAL MEDICINE

## 2021-09-30 RX ORDER — TAMSULOSIN HYDROCHLORIDE 0.4 MG/1
0.4 CAPSULE ORAL DAILY
Qty: 14 CAPSULE | Refills: 0 | Status: SHIPPED | OUTPATIENT
Start: 2021-09-30 | End: 2021-10-26

## 2021-09-30 RX ORDER — ATORVASTATIN CALCIUM 40 MG/1
40 TABLET, FILM COATED ORAL DAILY
Qty: 90 TABLET | Refills: 3 | Status: SHIPPED | OUTPATIENT
Start: 2021-09-30 | End: 2022-11-07

## 2021-09-30 ASSESSMENT — MIFFLIN-ST. JEOR: SCORE: 1629.79

## 2021-09-30 ASSESSMENT — ENCOUNTER SYMPTOMS
PALPITATIONS: 0
PARESTHESIAS: 0
HEARTBURN: 0
SHORTNESS OF BREATH: 0
COUGH: 0
FREQUENCY: 1
JOINT SWELLING: 0
HEMATURIA: 0
DYSURIA: 0
NAUSEA: 0
CONSTIPATION: 0
WEAKNESS: 0
MYALGIAS: 0
DIZZINESS: 0
DIARRHEA: 0
HEMATOCHEZIA: 0
EYE PAIN: 0
SORE THROAT: 0
CHILLS: 0
ABDOMINAL PAIN: 0
ARTHRALGIAS: 0
NERVOUS/ANXIOUS: 0
FEVER: 0

## 2021-09-30 ASSESSMENT — ACTIVITIES OF DAILY LIVING (ADL): CURRENT_FUNCTION: NO ASSISTANCE NEEDED

## 2021-09-30 NOTE — PROGRESS NOTES
"SUBJECTIVE:   Yoel Stevenson is a 81 year old male who presents for Preventive Visit.      Patient has been advised of split billing requirements and indicates understanding: Yes   Are you in the first 12 months of your Medicare coverage?  No    Healthy Habits:     In general, how would you rate your overall health?  Excellent    Frequency of exercise:  6-7 days/week    Duration of exercise:  Greater than 60 minutes    Do you usually eat at least 4 servings of fruit and vegetables a day, include whole grains    & fiber and avoid regularly eating high fat or \"junk\" foods?  Yes    Medication side effects:  None    Ability to successfully perform activities of daily living:  No assistance needed    Home Safety:  No safety concerns identified    Hearing Impairment:  No hearing concerns    In the past 6 months, have you been bothered by leaking of urine? Yes    In general, how would you rate your overall mental or emotional health?  Excellent      PHQ-2 Total Score: 0    Additional concerns today:  No    Do you feel safe in your environment? Yes    Have you ever done Advance Care Planning? (For example, a Health Directive, POLST, or a discussion with a medical provider or your loved ones about your wishes): Yes, advance care planning is on file.       Fall risk  Fallen 2 or more times in the past year?: No  Any fall with injury in the past year?: No    Cognitive Screening   1) Repeat 3 items (Leader, Season, Table)    2) Clock draw: NORMAL  3) 3 item recall: Recalls 3 objects  Results: 3 items recalled: COGNITIVE IMPAIRMENT LESS LIKELY    Mini-CogTM Copyright [employer]. Licensed by the author for use in [facility]; reprinted with permission ([email or web address]). All rights reserved.      Do you have sleep apnea, excessive snoring or daytime drowsiness?: no    Reviewed and updated as needed this visit by clinical staff                 Reviewed and updated as needed this visit by Provider                Social " "History     Tobacco Use     Smoking status: Never Smoker     Smokeless tobacco: Never Used   Substance Use Topics     Alcohol use: Yes     Comment: minimal     If you drink alcohol do you typically have >3 drinks per day or >7 drinks per week? No    Alcohol Use 9/30/2021   Prescreen: >3 drinks/day or >7 drinks/week? No               Current providers sharing in care for this patient include:   Patient Care Team:  Kunal Bettencourt MD as PCP - General (Internal Medicine)  Kunal Bettencourt MD as Assigned PCP    The following health maintenance items are reviewed in Epic and correct as of today:  Health Maintenance Due   Topic Date Due     ANNUAL REVIEW OF HM ORDERS  Never done     MEDICARE ANNUAL WELLNESS VISIT  07/30/2020     INFLUENZA VACCINE (1) 09/01/2021     FALL RISK ASSESSMENT  09/24/2021     Lab work is in process          Review of Systems  Constitutional, HEENT, cardiovascular, pulmonary, gi and gu systems are negative, except as otherwise noted.    OBJECTIVE:   There were no vitals taken for this visit. Estimated body mass index is 27.89 kg/m  as calculated from the following:    Height as of 1/7/21: 1.803 m (5' 11\").    Weight as of 1/7/21: 90.7 kg (200 lb).  Physical Exam  GENERAL: healthy, alert and no distress  EYES: Eyes grossly normal to inspection, PERRL and conjunctivae and sclerae normal  HENT: ear canals and TM's normal, nose and mouth without ulcers or lesions  NECK: no adenopathy, no asymmetry, masses, or scars and thyroid normal to palpation  RESP: lungs clear to auscultation - no rales, rhonchi or wheezes  CV: regular rate and rhythm, normal S1 S2, no S3 or S4, no murmur, click or rub, no peripheral edema and peripheral pulses strong  ABDOMEN: soft, nontender, no hepatosplenomegaly, no masses and bowel sounds normal  MS: no gross musculoskeletal defects noted, no edema  SKIN: no suspicious lesions or rashes  NEURO: Normal strength and tone, mentation intact and speech normal  PSYCH: mentation " "appears normal, affect normal/bright    Diagnostic Test Results:  Labs reviewed in Epic    ASSESSMENT / PLAN:       ICD-10-CM    1. Encounter for screening and preventative care  Z00.00 Comprehensive metabolic panel (BMP + Alb, Alk Phos, ALT, AST, Total. Bili, TP)     CBC with platelets and differential     TSH with free T4 reflex     Lipid panel reflex to direct LDL Fasting     PSA, screen   2. Benign prostatic hyperplasia with urinary frequency  N40.1 tamsulosin (FLOMAX) 0.4 MG capsule    R35.0    3. Hyperlipidemia LDL goal <100  E78.5 atorvastatin (LIPITOR) 40 MG tablet   4. Encounter for screening for malignant neoplasm of prostate   Z12.5 PSA, screen       Patient has been advised of split billing requirements and indicates understanding: Yes  COUNSELING:  Reviewed preventive health counseling, as reflected in patient instructions    Estimated body mass index is 27.89 kg/m  as calculated from the following:    Height as of 1/7/21: 1.803 m (5' 11\").    Weight as of 1/7/21: 90.7 kg (200 lb).        He reports that he has never smoked. He has never used smokeless tobacco.      Appropriate preventive services were discussed with this patient, including applicable screening as appropriate for cardiovascular disease, diabetes, osteopenia/osteoporosis, and glaucoma.  As appropriate for age/gender, discussed screening for colorectal cancer, prostate cancer, breast cancer, and cervical cancer. Checklist reviewing preventive services available has been given to the patient.    Reviewed patients plan of care and provided an AVS. The Basic Care Plan (routine screening as documented in Health Maintenance) for Yoel meets the Care Plan requirement. This Care Plan has been established and reviewed with the Patient.    Counseling Resources:  ATP IV Guidelines  Pooled Cohorts Equation Calculator  Breast Cancer Risk Calculator  Breast Cancer: Medication to Reduce Risk  FRAX Risk Assessment  ICSI Preventive Guidelines  Dietary " Guidelines for Americans, 2010  USDA's MyPlate  ASA Prophylaxis  Lung CA Screening    Kunal Bettencourt MD  [facility]    Identified Health Risks:

## 2021-09-30 NOTE — LETTER
October 1, 2021      Yoel Stevenson  [address] [place] [address]        Dear ,    We are writing to inform you of your test results.    Enclosed are your results.    Resulted Orders   Comprehensive metabolic panel (BMP + Alb, Alk Phos, ALT, AST, Total. Bili, TP)   Result Value Ref Range    Sodium 139 133 - 144 mmol/L    Potassium 4.6 3.4 - 5.3 mmol/L    Chloride 106 94 - 109 mmol/L    Carbon Dioxide (CO2) 30 20 - 32 mmol/L    Anion Gap 3 3 - 14 mmol/L    Urea Nitrogen 15 7 - 30 mg/dL    Creatinine 0.98 0.66 - 1.25 mg/dL    Calcium 8.7 8.5 - 10.1 mg/dL    Glucose 94 70 - 99 mg/dL    Alkaline Phosphatase 63 40 - 150 U/L    AST 17 0 - 45 U/L    ALT 19 0 - 70 U/L    Protein Total 6.9 6.8 - 8.8 g/dL    Albumin 3.6 3.4 - 5.0 g/dL    Bilirubin Total 1.0 0.2 - 1.3 mg/dL    GFR Estimate 72 >60 mL/min/1.73m2      Comment:      As of July 11, 2021, eGFR is calculated by the CKD-EPI creatinine equation, without race adjustment. eGFR can be influenced by muscle mass, exercise, and diet. The reported eGFR is an estimation only and is only applicable if the renal function is stable.   TSH with free T4 reflex   Result Value Ref Range    TSH 1.76 0.40 - 4.00 mU/L   Lipid panel reflex to direct LDL Fasting   Result Value Ref Range    Cholesterol 108 <200 mg/dL    Triglycerides 81 <150 mg/dL    Direct Measure HDL 39 (L) >=40 mg/dL    LDL Cholesterol Calculated 53 <=100 mg/dL    Non HDL Cholesterol 69 <130 mg/dL    Patient Fasting > 8hrs? Yes     Narrative    Cholesterol  Desirable:  <200 mg/dL    Triglycerides  Normal:  Less than 150 mg/dL  Borderline High:  150-199 mg/dL  High:  200-499 mg/dL  Very High:  Greater than or equal to 500 mg/dL    Direct Measure HDL  Female:  Greater than or equal to 50 mg/dL   Male:  Greater than or equal to 40 mg/dL    LDL Cholesterol  Desirable:  <100mg/dL  Above Desirable:  100-129 mg/dL   Borderline High:  130-159 mg/dL   High:  160-189 mg/dL   Very High:  >= 190  mg/dL    Non HDL Cholesterol  Desirable:  130 mg/dL  Above Desirable:  130-159 mg/dL  Borderline High:  160-189 mg/dL  High:  190-219 mg/dL  Very High:  Greater than or equal to 220 mg/dL   PSA, screen   Result Value Ref Range    Prostate Specific Antigen Screen 1.66 0.00 - 4.00 ug/L   CBC with platelets and differential   Result Value Ref Range    WBC Count 4.8 4.0 - 11.0 10e3/uL    RBC Count 4.51 4.40 - 5.90 10e6/uL    Hemoglobin 14.1 13.3 - 17.7 g/dL    Hematocrit 41.9 40.0 - 53.0 %    MCV 93 78 - 100 fL    MCH 31.3 26.5 - 33.0 pg    MCHC 33.7 31.5 - 36.5 g/dL    RDW 15.0 10.0 - 15.0 %    Platelet Count 183 150 - 450 10e3/uL    % Neutrophils 57 %    % Lymphocytes 29 %    % Monocytes 13 %    % Eosinophils 1 %    % Basophils 0 %    Absolute Neutrophils 2.8 1.6 - 8.3 10e3/uL    Absolute Lymphocytes 1.4 0.8 - 5.3 10e3/uL    Absolute Monocytes 0.6 0.0 - 1.3 10e3/uL    Absolute Eosinophils 0.0 0.0 - 0.7 10e3/uL    Absolute Basophils 0.0 0.0 - 0.2 10e3/uL       If you have any questions or concerns, please call the clinic at the number listed above.       Sincerely,      Kunal Bettencourt MD

## 2021-10-01 LAB
ALBUMIN SERPL-MCNC: 3.6 G/DL (ref 3.4–5)
ALP SERPL-CCNC: 63 U/L (ref 40–150)
ALT SERPL W P-5'-P-CCNC: 19 U/L (ref 0–70)
ANION GAP SERPL CALCULATED.3IONS-SCNC: 3 MMOL/L (ref 3–14)
AST SERPL W P-5'-P-CCNC: 17 U/L (ref 0–45)
BILIRUB SERPL-MCNC: 1 MG/DL (ref 0.2–1.3)
BUN SERPL-MCNC: 15 MG/DL (ref 7–30)
CALCIUM SERPL-MCNC: 8.7 MG/DL (ref 8.5–10.1)
CHLORIDE BLD-SCNC: 106 MMOL/L (ref 94–109)
CHOLEST SERPL-MCNC: 108 MG/DL
CO2 SERPL-SCNC: 30 MMOL/L (ref 20–32)
CREAT SERPL-MCNC: 0.98 MG/DL (ref 0.66–1.25)
FASTING STATUS PATIENT QL REPORTED: YES
GFR SERPL CREATININE-BSD FRML MDRD: 72 ML/MIN/1.73M2
GLUCOSE BLD-MCNC: 94 MG/DL (ref 70–99)
HDLC SERPL-MCNC: 39 MG/DL
LDLC SERPL CALC-MCNC: 53 MG/DL
NONHDLC SERPL-MCNC: 69 MG/DL
POTASSIUM BLD-SCNC: 4.6 MMOL/L (ref 3.4–5.3)
PROT SERPL-MCNC: 6.9 G/DL (ref 6.8–8.8)
PSA SERPL-MCNC: 1.66 UG/L (ref 0–4)
SODIUM SERPL-SCNC: 139 MMOL/L (ref 133–144)
TRIGL SERPL-MCNC: 81 MG/DL
TSH SERPL DL<=0.005 MIU/L-ACNC: 1.76 MU/L (ref 0.4–4)

## 2021-10-14 ENCOUNTER — TRANSFERRED RECORDS (OUTPATIENT)
Dept: HEALTH INFORMATION MANAGEMENT | Facility: CLINIC | Age: 81
End: 2021-10-14
Payer: COMMERCIAL

## 2021-10-22 DIAGNOSIS — N40.1 BENIGN PROSTATIC HYPERPLASIA WITH URINARY FREQUENCY: ICD-10-CM

## 2021-10-22 DIAGNOSIS — R35.0 BENIGN PROSTATIC HYPERPLASIA WITH URINARY FREQUENCY: ICD-10-CM

## 2021-10-25 NOTE — TELEPHONE ENCOUNTER
Routing refill request to provider for review/approval because:  Failed protocol    Christiana PASCUAL RN  EP Triage

## 2021-10-26 RX ORDER — TAMSULOSIN HYDROCHLORIDE 0.4 MG/1
CAPSULE ORAL
Qty: 14 CAPSULE | Refills: 0 | Status: SHIPPED | OUTPATIENT
Start: 2021-10-26 | End: 2021-11-29

## 2021-11-29 DIAGNOSIS — N40.1 BENIGN PROSTATIC HYPERPLASIA WITH URINARY FREQUENCY: ICD-10-CM

## 2021-11-29 DIAGNOSIS — R35.0 BENIGN PROSTATIC HYPERPLASIA WITH URINARY FREQUENCY: ICD-10-CM

## 2021-11-29 NOTE — TELEPHONE ENCOUNTER
Reason for Call:  Medication or medication refill:    Do you use a [facility]?  Name of the pharmacy and phone number for the current request:  Patient is currently in [place] need meds sent to [facility]  there phone number is [phone]    Name of the medication requested: tamsulosin (FLOMAX) 0.4 MG capsule    Other request: Patient states the medication worked well and would like a full prescription for the medication. They are currently in [place] and would like it sent to the [facility] there.Patient is wanting a 90 day supply.     Can we leave a detailed message on this number? YES    Phone number patient can be reached at: Home number on file [phone] (home) or Cell number on file:    Telephone Information:   Mobile [phone]       Best Time: N/a    Call taken on 11/29/2021 at 9:29 AM by Kirk Smith

## 2021-12-01 RX ORDER — TAMSULOSIN HYDROCHLORIDE 0.4 MG/1
CAPSULE ORAL
Qty: 14 CAPSULE | Refills: 0 | Status: SHIPPED | OUTPATIENT
Start: 2021-12-01 | End: 2022-08-02

## 2021-12-01 NOTE — TELEPHONE ENCOUNTER
Routing refill request to provider for review/approval because:    Drug not on the [facility] refill protocol .   Alpha Blockers Failed 11/29/2021 09:37 AM   Protocol Details  Patient does not have Tadalafil, Vardenafil, or Sildenafil on their medication list           Bekah Knowles RN  [facility] Triage

## 2021-12-14 DIAGNOSIS — N52.9 ERECTILE DYSFUNCTION, UNSPECIFIED ERECTILE DYSFUNCTION TYPE: ICD-10-CM

## 2021-12-14 DIAGNOSIS — R35.0 BENIGN PROSTATIC HYPERPLASIA WITH URINARY FREQUENCY: ICD-10-CM

## 2021-12-14 DIAGNOSIS — N40.1 BENIGN PROSTATIC HYPERPLASIA WITH URINARY FREQUENCY: ICD-10-CM

## 2021-12-15 RX ORDER — TADALAFIL 5 MG/1
TABLET ORAL
Qty: 30 TABLET | Refills: 1 | Status: SHIPPED | OUTPATIENT
Start: 2021-12-15 | End: 2022-03-09

## 2021-12-15 NOTE — TELEPHONE ENCOUNTER
Routing refill request to provider for review/approval because:  Fails protocol:   Erectile Dysfuction Protocol Failed 12/14/2021 11:47 AM   Protocol Details  Absence of Alpha Blockers on Med list

## 2022-03-09 DIAGNOSIS — N40.1 BENIGN PROSTATIC HYPERPLASIA WITH URINARY FREQUENCY: ICD-10-CM

## 2022-03-09 DIAGNOSIS — R35.0 BENIGN PROSTATIC HYPERPLASIA WITH URINARY FREQUENCY: ICD-10-CM

## 2022-03-09 DIAGNOSIS — N52.9 ERECTILE DYSFUNCTION, UNSPECIFIED ERECTILE DYSFUNCTION TYPE: ICD-10-CM

## 2022-03-10 RX ORDER — TADALAFIL 5 MG/1
5 TABLET ORAL DAILY
Qty: 30 TABLET | Refills: 1 | Status: SHIPPED | OUTPATIENT
Start: 2022-03-10 | End: 2022-03-16

## 2022-03-10 NOTE — TELEPHONE ENCOUNTER
Routing refill request to provider for review/approval because:          Fernanda LAI, Triage RN  [facility]

## 2022-03-13 DIAGNOSIS — N52.9 ERECTILE DYSFUNCTION, UNSPECIFIED ERECTILE DYSFUNCTION TYPE: ICD-10-CM

## 2022-03-13 DIAGNOSIS — R35.0 BENIGN PROSTATIC HYPERPLASIA WITH URINARY FREQUENCY: ICD-10-CM

## 2022-03-13 DIAGNOSIS — N40.1 BENIGN PROSTATIC HYPERPLASIA WITH URINARY FREQUENCY: ICD-10-CM

## 2022-03-15 NOTE — TELEPHONE ENCOUNTER
Routing refill request to provider for review/approval because:    Erectile Dysfuction Protocol Failed 03/13/2022 02:31 PM   Protocol Details  Absence of Alpha Blockers on Med list     Radha Somers RN

## 2022-03-16 RX ORDER — TADALAFIL 5 MG/1
TABLET ORAL
Qty: 30 TABLET | Refills: 1 | Status: SHIPPED | OUTPATIENT
Start: 2022-03-16 | End: 2022-06-02

## 2022-05-05 ENCOUNTER — TRANSFERRED RECORDS (OUTPATIENT)
Dept: HEALTH INFORMATION MANAGEMENT | Facility: CLINIC | Age: 82
End: 2022-05-05
Payer: COMMERCIAL

## 2022-05-06 ENCOUNTER — TELEPHONE (OUTPATIENT)
Dept: FAMILY MEDICINE | Facility: CLINIC | Age: 82
End: 2022-05-06

## 2022-05-06 NOTE — TELEPHONE ENCOUNTER
Please abstract the following data from this visit with this patient into the appropriate field in Epic:    Tests that can be patient reported without a hard copy:    Eye exam with ophthalmology on this date: 5/5/2022 at [facility]     Other Tests found in the patient's chart through Chart Review/Care Everywhere:    Note to Abstraction: If this section is blank, no results were found via Chart Review/Care Everywhere.      Eloisa SANCHEZ MA on 5/6/2022 at 11:57 AM

## 2022-05-31 DIAGNOSIS — N52.9 ERECTILE DYSFUNCTION, UNSPECIFIED ERECTILE DYSFUNCTION TYPE: ICD-10-CM

## 2022-05-31 DIAGNOSIS — R35.0 BENIGN PROSTATIC HYPERPLASIA WITH URINARY FREQUENCY: ICD-10-CM

## 2022-05-31 DIAGNOSIS — N40.1 BENIGN PROSTATIC HYPERPLASIA WITH URINARY FREQUENCY: ICD-10-CM

## 2022-06-02 RX ORDER — TADALAFIL 5 MG/1
TABLET ORAL
Qty: 30 TABLET | Refills: 1 | Status: SHIPPED | OUTPATIENT
Start: 2022-06-02 | End: 2022-08-02

## 2022-06-02 NOTE — TELEPHONE ENCOUNTER
Routing refill request to provider for review/approval because:  Erectile Dysfuction Protocol Failed 05/31/2022 03:10 PM   Protocol Details  Absence of Alpha Blockers on Med list   Last OV 9/30/21    Ophelia Vee RN

## 2022-08-02 ENCOUNTER — OFFICE VISIT (OUTPATIENT)
Dept: FAMILY MEDICINE | Facility: CLINIC | Age: 82
End: 2022-08-02
Payer: COMMERCIAL

## 2022-08-02 VITALS
HEART RATE: 71 BPM | DIASTOLIC BLOOD PRESSURE: 73 MMHG | WEIGHT: 199 LBS | SYSTOLIC BLOOD PRESSURE: 119 MMHG | OXYGEN SATURATION: 97 % | TEMPERATURE: 97.8 F | BODY MASS INDEX: 27.86 KG/M2 | RESPIRATION RATE: 16 BRPM | HEIGHT: 71 IN

## 2022-08-02 DIAGNOSIS — N40.1 BENIGN PROSTATIC HYPERPLASIA WITH URINARY FREQUENCY: Primary | ICD-10-CM

## 2022-08-02 DIAGNOSIS — N52.9 ERECTILE DYSFUNCTION, UNSPECIFIED ERECTILE DYSFUNCTION TYPE: ICD-10-CM

## 2022-08-02 DIAGNOSIS — R35.0 BENIGN PROSTATIC HYPERPLASIA WITH URINARY FREQUENCY: Primary | ICD-10-CM

## 2022-08-02 PROCEDURE — 99214 OFFICE O/P EST MOD 30 MIN: CPT | Performed by: INTERNAL MEDICINE

## 2022-08-02 RX ORDER — TADALAFIL 5 MG/1
5 TABLET ORAL DAILY
Qty: 90 TABLET | Refills: 1 | Status: SHIPPED | OUTPATIENT
Start: 2022-08-02 | End: 2023-04-17

## 2022-08-02 RX ORDER — ALFUZOSIN HYDROCHLORIDE 10 MG/1
10 TABLET, EXTENDED RELEASE ORAL DAILY
Qty: 30 TABLET | Refills: 0 | Status: SHIPPED | OUTPATIENT
Start: 2022-08-02 | End: 2022-08-27

## 2022-08-02 ASSESSMENT — PAIN SCALES - GENERAL: PAINLEVEL: NO PAIN (0)

## 2022-08-02 NOTE — PATIENT INSTRUCTIONS
START:  Uroxatrol-    Take daily.    After a week if the night time urination hasn't reduced stop the medication and let me know!    We would then refer you to a Urologist.

## 2022-08-02 NOTE — PROGRESS NOTES
"  Assessment & Plan     Benign prostatic hyperplasia with urinary frequency  Ongoing issue.  PSA and exam from last fall reviewed. Did note improvement with flomax but felt it was affecting sex drive.  Trial med as below.  Refer to Urology if needed.    - alfuzosin ER (UROXATRAL) 10 MG 24 hr tablet  Dispense: 30 tablet; Refill: 0  - tadalafil (CIALIS) 5 MG tablet  Dispense: 90 tablet; Refill: 1    Erectile dysfunction, unspecified erectile dysfunction type  Takes 5mg cialis every 3rd day only. States it has the desired effect.  Would like 90 day rx [facility] to [facility]    - tadalafil (CIALIS) 5 MG tablet  Dispense: 90 tablet; Refill: 1         BMI:   Estimated body mass index is 27.75 kg/m  as calculated from the following:    Height as of this encounter: 1.803 m (5' 11\").    Weight as of this encounter: 90.3 kg (199 lb).           No follow-ups on file.    Kurtis Mattson MD  [facility] ZAIN Diallo is a 82 year old, presenting for the following health issues:  Urinary Problem  new to me, established in clinic    C/o urinary frequency and nocturia.  Ongoing.  2-4 times a night.    No hematuria or dysuria.  Trying to reduce fluid intake in the evenings.      Pt was started on Flomax last fall.  He felt it affected his sex drive.  Was then changed to once daily cialis but takes once every 3 days only.     HPI     Genitourinary - Male  Onset/Duration: 2 weeks   Description:   Dysuria (painful urination): No}  Hematuria (blood in urine): No  Frequency: YES  Waking at night to urinate: YES  Hesitancy (delay in urine): No  Retention (unable to empty): No  Decrease in urinary flow: No  Incontinence: No  Progression of Symptoms:  same  Accompanying Signs & Symptoms:  Fever: No  Back/Flank pain: No  Urethral discharge: No  Testicle lumps/masses/pain: No  Nausea and/or vomiting: No  Abdominal pain: No  History:   History of frequent UTI s: No  History of kidney stones: No  History of hernias: " "YES  Personal or Family history of Prostate problems: YES  Sexually active: YES  Precipitating or alleviating factors: None  Therapies tried and outcome: Tylenol        Review of Systems         Objective    /73 (BP Location: Left arm, Patient Position: Chair, Cuff Size: Adult Large)   Pulse 71   Temp 97.8  F (36.6  C) (Temporal)   Resp 16   Ht 1.803 m (5' 11\")   Wt 90.3 kg (199 lb)   SpO2 97%   BMI 27.75 kg/m    Body mass index is 27.75 kg/m .  Physical Exam   GENERAL: healthy, alert and no distress  NECK: no adenopathy, no asymmetry, masses, or scars and thyroid normal to palpation  RESP: lungs clear to auscultation - no rales, rhonchi or wheezes  CV: regular rate and rhythm, normal S1 S2, no S3 or S4, no murmur, click or rub, no peripheral edema and peripheral pulses strong                    .  ..  "

## 2022-08-24 DIAGNOSIS — N40.1 BENIGN PROSTATIC HYPERPLASIA WITH URINARY FREQUENCY: ICD-10-CM

## 2022-08-24 DIAGNOSIS — R35.0 BENIGN PROSTATIC HYPERPLASIA WITH URINARY FREQUENCY: ICD-10-CM

## 2022-08-26 NOTE — TELEPHONE ENCOUNTER
Routing refill request to provider for review/approval because:   Patient does not have Tadalafil, Vardenafil, or Sildenafil on their medication list    Jazzy Bryan RN

## 2022-08-27 RX ORDER — ALFUZOSIN HYDROCHLORIDE 10 MG/1
10 TABLET, EXTENDED RELEASE ORAL DAILY
Qty: 30 TABLET | Refills: 0 | Status: SHIPPED | OUTPATIENT
Start: 2022-08-27 | End: 2022-09-23

## 2022-09-22 DIAGNOSIS — R35.0 BENIGN PROSTATIC HYPERPLASIA WITH URINARY FREQUENCY: ICD-10-CM

## 2022-09-22 DIAGNOSIS — N40.1 BENIGN PROSTATIC HYPERPLASIA WITH URINARY FREQUENCY: ICD-10-CM

## 2022-09-23 RX ORDER — ALFUZOSIN HYDROCHLORIDE 10 MG/1
10 TABLET, EXTENDED RELEASE ORAL DAILY
Qty: 30 TABLET | Refills: 0 | Status: SHIPPED | OUTPATIENT
Start: 2022-09-23 | End: 2022-10-04

## 2022-09-23 NOTE — TELEPHONE ENCOUNTER
Routing refill request to provider for review/approval because:       Alpha Blockers Failed 09/22/2022 09:34 AM   Protocol Details  Patient does not have Tadalafil, Vardenafil, or Sildenafil on their medication list

## 2022-10-04 ENCOUNTER — OFFICE VISIT (OUTPATIENT)
Dept: FAMILY MEDICINE | Facility: CLINIC | Age: 82
End: 2022-10-04
Payer: COMMERCIAL

## 2022-10-04 VITALS
DIASTOLIC BLOOD PRESSURE: 72 MMHG | TEMPERATURE: 95.9 F | HEIGHT: 71 IN | RESPIRATION RATE: 16 BRPM | OXYGEN SATURATION: 96 % | BODY MASS INDEX: 28 KG/M2 | HEART RATE: 63 BPM | SYSTOLIC BLOOD PRESSURE: 133 MMHG | WEIGHT: 200 LBS

## 2022-10-04 DIAGNOSIS — Z00.00 ENCOUNTER FOR PREVENTIVE HEALTH EXAMINATION: Primary | ICD-10-CM

## 2022-10-04 DIAGNOSIS — R35.0 BENIGN PROSTATIC HYPERPLASIA WITH URINARY FREQUENCY: ICD-10-CM

## 2022-10-04 DIAGNOSIS — N40.1 BENIGN PROSTATIC HYPERPLASIA WITH URINARY FREQUENCY: ICD-10-CM

## 2022-10-04 DIAGNOSIS — Z12.5 ENCOUNTER FOR SCREENING FOR MALIGNANT NEOPLASM OF PROSTATE: ICD-10-CM

## 2022-10-04 LAB
ALBUMIN UR-MCNC: NEGATIVE MG/DL
APPEARANCE UR: CLEAR
BACTERIA #/AREA URNS HPF: ABNORMAL /HPF
BASOPHILS # BLD AUTO: 0 10E3/UL (ref 0–0.2)
BASOPHILS NFR BLD AUTO: 0 %
BILIRUB UR QL STRIP: NEGATIVE
COLOR UR AUTO: YELLOW
EOSINOPHIL # BLD AUTO: 0 10E3/UL (ref 0–0.7)
EOSINOPHIL NFR BLD AUTO: 0 %
ERYTHROCYTE [DISTWIDTH] IN BLOOD BY AUTOMATED COUNT: 13.9 % (ref 10–15)
GLUCOSE UR STRIP-MCNC: NEGATIVE MG/DL
HCT VFR BLD AUTO: 41.1 % (ref 40–53)
HGB BLD-MCNC: 13.8 G/DL (ref 13.3–17.7)
HGB UR QL STRIP: NEGATIVE
IMM GRANULOCYTES # BLD: 0 10E3/UL
IMM GRANULOCYTES NFR BLD: 0 %
KETONES UR STRIP-MCNC: NEGATIVE MG/DL
LEUKOCYTE ESTERASE UR QL STRIP: NEGATIVE
LYMPHOCYTES # BLD AUTO: 1.8 10E3/UL (ref 0.8–5.3)
LYMPHOCYTES NFR BLD AUTO: 31 %
MCH RBC QN AUTO: 31.1 PG (ref 26.5–33)
MCHC RBC AUTO-ENTMCNC: 33.6 G/DL (ref 31.5–36.5)
MCV RBC AUTO: 93 FL (ref 78–100)
MONOCYTES # BLD AUTO: 0.7 10E3/UL (ref 0–1.3)
MONOCYTES NFR BLD AUTO: 12 %
MUCOUS THREADS #/AREA URNS LPF: PRESENT /LPF
NEUTROPHILS # BLD AUTO: 3.2 10E3/UL (ref 1.6–8.3)
NEUTROPHILS NFR BLD AUTO: 56 %
NITRATE UR QL: NEGATIVE
PH UR STRIP: 5.5 [PH] (ref 5–7)
PLATELET # BLD AUTO: 174 10E3/UL (ref 150–450)
RBC # BLD AUTO: 4.44 10E6/UL (ref 4.4–5.9)
RBC #/AREA URNS AUTO: ABNORMAL /HPF
SP GR UR STRIP: 1.02 (ref 1–1.03)
UROBILINOGEN UR STRIP-ACNC: 0.2 E.U./DL
WBC # BLD AUTO: 5.7 10E3/UL (ref 4–11)
WBC #/AREA URNS AUTO: ABNORMAL /HPF

## 2022-10-04 PROCEDURE — 36415 COLL VENOUS BLD VENIPUNCTURE: CPT | Performed by: INTERNAL MEDICINE

## 2022-10-04 PROCEDURE — 84443 ASSAY THYROID STIM HORMONE: CPT | Performed by: INTERNAL MEDICINE

## 2022-10-04 PROCEDURE — 81001 URINALYSIS AUTO W/SCOPE: CPT | Performed by: INTERNAL MEDICINE

## 2022-10-04 PROCEDURE — 85025 COMPLETE CBC W/AUTO DIFF WBC: CPT | Performed by: INTERNAL MEDICINE

## 2022-10-04 PROCEDURE — G0103 PSA SCREENING: HCPCS | Performed by: INTERNAL MEDICINE

## 2022-10-04 PROCEDURE — 80053 COMPREHEN METABOLIC PANEL: CPT | Performed by: INTERNAL MEDICINE

## 2022-10-04 PROCEDURE — G0439 PPPS, SUBSEQ VISIT: HCPCS | Performed by: INTERNAL MEDICINE

## 2022-10-04 ASSESSMENT — ENCOUNTER SYMPTOMS
HEMATOCHEZIA: 0
JOINT SWELLING: 0
NAUSEA: 0
CONSTIPATION: 0
MYALGIAS: 0
EYE PAIN: 0
NERVOUS/ANXIOUS: 0
SHORTNESS OF BREATH: 0
ABDOMINAL PAIN: 0
WEAKNESS: 0
HEADACHES: 0
DIZZINESS: 0
FREQUENCY: 1
PARESTHESIAS: 0
CHILLS: 0
HEARTBURN: 0
PALPITATIONS: 0
SORE THROAT: 0
ARTHRALGIAS: 0
DIARRHEA: 0
HEMATURIA: 0
COUGH: 0
DYSURIA: 0

## 2022-10-04 ASSESSMENT — PAIN SCALES - GENERAL: PAINLEVEL: NO PAIN (0)

## 2022-10-04 ASSESSMENT — ACTIVITIES OF DAILY LIVING (ADL): CURRENT_FUNCTION: NO ASSISTANCE NEEDED

## 2022-10-04 NOTE — PROGRESS NOTES
"SUBJECTIVE:   Yoel is a 82 year old who presents for Preventive Visit.      Patient has been advised of split billing requirements and indicates understanding: Yes  Are you in the first 12 months of your Medicare coverage?  No    Healthy Habits:     In general, how would you rate your overall health?  Excellent    Frequency of exercise:  4-5 days/week    Duration of exercise:  30-45 minutes    Do you usually eat at least 4 servings of fruit and vegetables a day, include whole grains    & fiber and avoid regularly eating high fat or \"junk\" foods?  Yes    Taking medications regularly:  Yes    Medication side effects:  None    Ability to successfully perform activities of daily living:  No assistance needed    Home Safety:  No safety concerns identified    Hearing Impairment:  No hearing concerns    In the past 6 months, have you been bothered by leaking of urine?  No    In general, how would you rate your overall mental or emotional health?  Excellent      PHQ-2 Total Score: 0    Additional concerns today:  No    Do you feel safe in your environment? Yes    Have you ever done Advance Care Planning? (For example, a Health Directive, POLST, or a discussion with a medical provider or your loved ones about your wishes): Yes, advance care planning is on file.       Fall risk  Fallen 2 or more times in the past year?: No  Any fall with injury in the past year?: No    Cognitive Screening   1) Repeat 3 items (Leader, Season, Table)    2) Clock draw: NORMAL  3) 3 item recall: Recalls 3 objects  Results: 3 items recalled: COGNITIVE IMPAIRMENT LESS LIKELY    Mini-CogTM Copyright [employer]. Licensed by the author for use in [facility]; reprinted with permission (sangeeta@.[facility]). All rights reserved.      Do you have sleep apnea, excessive snoring or daytime drowsiness?: no    Reviewed and updated as needed this visit by clinical staff                    Reviewed and updated as needed this visit by Provider           "         Social History     Tobacco Use     Smoking status: Never Smoker     Smokeless tobacco: Never Used   Substance Use Topics     Alcohol use: Yes     Comment: minimal     If you drink alcohol do you typically have >3 drinks per day or >7 drinks per week? No    Alcohol Use 9/30/2021   Prescreen: >3 drinks/day or >7 drinks/week? No   Prescreen: >3 drinks/day or >7 drinks/week? -               Current providers sharing in care for this patient include:   Patient Care Team:  Kunal Bettencourt MD as PCP - General (Internal Medicine)  Kunal Bettencourt MD as Assigned PCP    The following health maintenance items are reviewed in Epic and correct as of today:  Health Maintenance   Topic Date Due     ANNUAL REVIEW OF HM ORDERS  Never done     COVID-19 Vaccine (4 - Booster for Pfizer series) 12/07/2021     INFLUENZA VACCINE (1) 09/01/2022     FALL RISK ASSESSMENT  09/30/2022     MEDICARE ANNUAL WELLNESS VISIT  09/30/2022     ADVANCE CARE PLANNING  09/30/2026     DTAP/TDAP/TD IMMUNIZATION (3 - Td or Tdap) 07/27/2030     PHQ-2 (once per calendar year)  Completed     Pneumococcal Vaccine: 65+ Years  Completed     ZOSTER IMMUNIZATION  Completed     IPV IMMUNIZATION  Aged Out     MENINGITIS IMMUNIZATION  Aged Out     HEPATITIS B IMMUNIZATION  Aged Out     Lab work is in process          Review of Systems   Constitutional: Negative for chills.   HENT: Negative for congestion, ear pain, hearing loss and sore throat.    Eyes: Negative for pain and visual disturbance.   Respiratory: Negative for cough and shortness of breath.    Cardiovascular: Negative for chest pain, palpitations and peripheral edema.   Gastrointestinal: Negative for abdominal pain, constipation, diarrhea, heartburn, hematochezia and nausea.   Genitourinary: Positive for frequency and impotence. Negative for dysuria, genital sores, hematuria, penile discharge and urgency.   Musculoskeletal: Negative for arthralgias, joint swelling and myalgias.   Skin: Negative  "for rash.   Neurological: Negative for dizziness, weakness, headaches and paresthesias.   Psychiatric/Behavioral: Negative for mood changes. The patient is not nervous/anxious.      Constitutional, HEENT, cardiovascular, pulmonary, GI, , musculoskeletal, neuro, skin, endocrine and psych systems are negative, except as otherwise noted.    OBJECTIVE:   There were no vitals taken for this visit. Estimated body mass index is 27.75 kg/m  as calculated from the following:    Height as of 8/2/22: 1.803 m (5' 11\").    Weight as of 8/2/22: 90.3 kg (199 lb).  Physical Exam  GENERAL: healthy, alert and no distress  EYES: Eyes grossly normal to inspection, PERRL and conjunctivae and sclerae normal  HENT: ear canals and TM's normal, nose and mouth without ulcers or lesions  NECK: no adenopathy, no asymmetry, masses, or scars and thyroid normal to palpation  RESP: lungs clear to auscultation - no rales, rhonchi or wheezes  CV: regular rate and rhythm, normal S1 S2, no S3 or S4, no murmur, click or rub, no peripheral edema and peripheral pulses strong  ABDOMEN: soft, nontender, no hepatosplenomegaly, no masses and bowel sounds normal  MS: no gross musculoskeletal defects noted, no edema  SKIN: no suspicious lesions or rashes  NEURO: Normal strength and tone, mentation intact and speech normal  PSYCH: mentation appears normal, affect normal/bright    Diagnostic Test Results:  Labs reviewed in Epic    ASSESSMENT / PLAN:       ICD-10-CM    1. Encounter for preventive health examination  Z00.00 Comprehensive metabolic panel (BMP + Alb, Alk Phos, ALT, AST, Total. Bili, TP)     PSA, screen     CBC with platelets and differential     TSH with free T4 reflex     UA with Microscopic reflex to Culture - lab collect     Comprehensive metabolic panel (BMP + Alb, Alk Phos, ALT, AST, Total. Bili, TP)     PSA, screen     CBC with platelets and differential     TSH with free T4 reflex     UA with Microscopic reflex to Culture - lab collect     " "Urine Microscopic      2. Benign prostatic hyperplasia with urinary frequency  N40.1     R35.0       3. Encounter for screening for malignant neoplasm of prostate   Z12.5 PSA, screen     PSA, screen              COUNSELING:  Reviewed preventive health counseling, as reflected in patient instructions    Estimated body mass index is 27.75 kg/m  as calculated from the following:    Height as of 8/2/22: 1.803 m (5' 11\").    Weight as of 8/2/22: 90.3 kg (199 lb).        He reports that he has never smoked. He has never used smokeless tobacco.      Appropriate preventive services were discussed with this patient, including applicable screening as appropriate for cardiovascular disease, diabetes, osteopenia/osteoporosis, and glaucoma.  As appropriate for age/gender, discussed screening for colorectal cancer, prostate cancer, breast cancer, and cervical cancer. Checklist reviewing preventive services available has been given to the patient.    Reviewed patients plan of care and provided an AVS. The Basic Care Plan (routine screening as documented in Health Maintenance) for Yoel meets the Care Plan requirement. This Care Plan has been established and reviewed with the Patient.    Counseling Resources:  ATP IV Guidelines  Pooled Cohorts Equation Calculator  Breast Cancer Risk Calculator  Breast Cancer: Medication to Reduce Risk  FRAX Risk Assessment  ICSI Preventive Guidelines  Dietary Guidelines for Americans, 2010  [employer]'s MyPlate  ASA Prophylaxis  Lung CA Screening    Kunal Bettencourt MD  [facility]    Identified Health Risks:  "

## 2022-10-04 NOTE — LETTER
October 5, 2022      Yoel Stevenson  [address]  [place] [address]        Dear ,    We are writing to inform you of your test results.    Your laboratory studies look fine.   Resulted Orders   Comprehensive metabolic panel (BMP + Alb, Alk Phos, ALT, AST, Total. Bili, TP)   Result Value Ref Range    Sodium 141 133 - 144 mmol/L    Potassium 4.3 3.4 - 5.3 mmol/L    Chloride 110 (H) 94 - 109 mmol/L    Carbon Dioxide (CO2) 27 20 - 32 mmol/L    Anion Gap 4 3 - 14 mmol/L    Urea Nitrogen 19 7 - 30 mg/dL    Creatinine 0.86 0.66 - 1.25 mg/dL    Calcium 8.8 8.5 - 10.1 mg/dL    Glucose 98 70 - 99 mg/dL    Alkaline Phosphatase 60 40 - 150 U/L    AST 18 0 - 45 U/L    ALT 18 0 - 70 U/L    Protein Total 6.7 (L) 6.8 - 8.8 g/dL    Albumin 3.5 3.4 - 5.0 g/dL    Bilirubin Total 0.8 0.2 - 1.3 mg/dL    GFR Estimate 86 >60 mL/min/1.73m2      Comment:      Effective December 21, 2021 eGFRcr in adults is calculated using the 2021 CKD-EPI creatinine equation which includes age and gender (Ellis et al., NE, DOI: 10.1056/XTDQzo2912699)   PSA, screen   Result Value Ref Range    Prostate Specific Antigen Screen 1.69 0.00 - 4.00 ug/L   TSH with free T4 reflex   Result Value Ref Range    TSH 1.99 0.40 - 4.00 mU/L   UA with Microscopic reflex to Culture - lab collect   Result Value Ref Range    Color Urine Yellow Colorless, Straw, Light Yellow, Yellow    Appearance Urine Clear Clear    Glucose Urine Negative Negative mg/dL    Bilirubin Urine Negative Negative    Ketones Urine Negative Negative mg/dL    Specific Gravity Urine 1.020 1.003 - 1.035    Blood Urine Negative Negative    pH Urine 5.5 5.0 - 7.0    Protein Albumin Urine Negative Negative mg/dL    Urobilinogen Urine 0.2 0.2, 1.0 E.U./dL    Nitrite Urine Negative Negative    Leukocyte Esterase Urine Negative Negative   CBC with platelets and differential   Result Value Ref Range    WBC Count 5.7 4.0 - 11.0 10e3/uL    RBC Count 4.44 4.40 - 5.90 10e6/uL    Hemoglobin  13.8 13.3 - 17.7 g/dL    Hematocrit 41.1 40.0 - 53.0 %    MCV 93 78 - 100 fL    MCH 31.1 26.5 - 33.0 pg    MCHC 33.6 31.5 - 36.5 g/dL    RDW 13.9 10.0 - 15.0 %    Platelet Count 174 150 - 450 10e3/uL    % Neutrophils 56 %    % Lymphocytes 31 %    % Monocytes 12 %    % Eosinophils 0 %    % Basophils 0 %    % Immature Granulocytes 0 %    Absolute Neutrophils 3.2 1.6 - 8.3 10e3/uL    Absolute Lymphocytes 1.8 0.8 - 5.3 10e3/uL    Absolute Monocytes 0.7 0.0 - 1.3 10e3/uL    Absolute Eosinophils 0.0 0.0 - 0.7 10e3/uL    Absolute Basophils 0.0 0.0 - 0.2 10e3/uL    Absolute Immature Granulocytes 0.0 <=0.4 10e3/uL   Urine Microscopic   Result Value Ref Range    Bacteria Urine Few (A) None Seen /HPF    RBC Urine 0-2 0-2 /HPF /HPF    WBC Urine 0-5 0-5 /HPF /HPF    Mucus Urine Present (A) None Seen /LPF    Narrative    Urine Culture not indicated   If you have any questions or concerns, please call the clinic at the number listed above.       Sincerely,      Kunal Bettencourt MD

## 2022-10-04 NOTE — PATIENT INSTRUCTIONS
Yoel;    I think you are doing very well.    I like to check your cholesterol and some other lab tests today including PSA    I would recommend the use of an over-the-counter spray called miconazole.  This will reduce any rash in the groin due to moisture.    I recommend you follow-up with one of our physicians i next year for a physical.    Best regards  Kunal

## 2022-10-05 LAB
ALBUMIN SERPL-MCNC: 3.5 G/DL (ref 3.4–5)
ALP SERPL-CCNC: 60 U/L (ref 40–150)
ALT SERPL W P-5'-P-CCNC: 18 U/L (ref 0–70)
ANION GAP SERPL CALCULATED.3IONS-SCNC: 4 MMOL/L (ref 3–14)
AST SERPL W P-5'-P-CCNC: 18 U/L (ref 0–45)
BILIRUB SERPL-MCNC: 0.8 MG/DL (ref 0.2–1.3)
BUN SERPL-MCNC: 19 MG/DL (ref 7–30)
CALCIUM SERPL-MCNC: 8.8 MG/DL (ref 8.5–10.1)
CHLORIDE BLD-SCNC: 110 MMOL/L (ref 94–109)
CO2 SERPL-SCNC: 27 MMOL/L (ref 20–32)
CREAT SERPL-MCNC: 0.86 MG/DL (ref 0.66–1.25)
GFR SERPL CREATININE-BSD FRML MDRD: 86 ML/MIN/1.73M2
GLUCOSE BLD-MCNC: 98 MG/DL (ref 70–99)
POTASSIUM BLD-SCNC: 4.3 MMOL/L (ref 3.4–5.3)
PROT SERPL-MCNC: 6.7 G/DL (ref 6.8–8.8)
PSA SERPL-MCNC: 1.69 UG/L (ref 0–4)
SODIUM SERPL-SCNC: 141 MMOL/L (ref 133–144)
TSH SERPL DL<=0.005 MIU/L-ACNC: 1.99 MU/L (ref 0.4–4)

## 2022-10-05 NOTE — RESULT ENCOUNTER NOTE
Please inform the patient that his laboratory studies look fine.  Kunal Bettencourt MD on 10/5/2022 at 1:41 PM

## 2022-10-26 ENCOUNTER — OFFICE VISIT (OUTPATIENT)
Dept: FAMILY MEDICINE | Facility: CLINIC | Age: 82
End: 2022-10-26
Payer: COMMERCIAL

## 2022-10-26 VITALS
WEIGHT: 203.9 LBS | TEMPERATURE: 98 F | RESPIRATION RATE: 20 BRPM | HEIGHT: 71 IN | OXYGEN SATURATION: 96 % | SYSTOLIC BLOOD PRESSURE: 108 MMHG | BODY MASS INDEX: 28.55 KG/M2 | DIASTOLIC BLOOD PRESSURE: 67 MMHG | HEART RATE: 67 BPM

## 2022-10-26 DIAGNOSIS — H35.30 MACULAR DEGENERATION (SENILE) OF RETINA: ICD-10-CM

## 2022-10-26 DIAGNOSIS — R35.0 BENIGN PROSTATIC HYPERPLASIA WITH URINARY FREQUENCY: ICD-10-CM

## 2022-10-26 DIAGNOSIS — E78.5 HYPERLIPIDEMIA LDL GOAL <100: Primary | ICD-10-CM

## 2022-10-26 DIAGNOSIS — N40.1 BENIGN PROSTATIC HYPERPLASIA WITH URINARY FREQUENCY: ICD-10-CM

## 2022-10-26 DIAGNOSIS — Z23 HIGH PRIORITY FOR 2019-NCOV VACCINE: ICD-10-CM

## 2022-10-26 DIAGNOSIS — Z23 NEED FOR PROPHYLACTIC VACCINATION AND INOCULATION AGAINST INFLUENZA: ICD-10-CM

## 2022-10-26 PROCEDURE — 0124A COVID-19,PF,PFIZER BOOSTER BIVALENT: CPT | Performed by: INTERNAL MEDICINE

## 2022-10-26 PROCEDURE — 90662 IIV NO PRSV INCREASED AG IM: CPT | Performed by: INTERNAL MEDICINE

## 2022-10-26 PROCEDURE — 91312 COVID-19,PF,PFIZER BOOSTER BIVALENT: CPT | Performed by: INTERNAL MEDICINE

## 2022-10-26 PROCEDURE — G0008 ADMIN INFLUENZA VIRUS VAC: HCPCS | Performed by: INTERNAL MEDICINE

## 2022-10-26 PROCEDURE — 99214 OFFICE O/P EST MOD 30 MIN: CPT | Mod: 25 | Performed by: INTERNAL MEDICINE

## 2022-10-26 ASSESSMENT — PAIN SCALES - GENERAL: PAINLEVEL: NO PAIN (0)

## 2022-10-26 NOTE — PROGRESS NOTES
"    Assessment & Plan     Hyperlipidemia LDL goal <100  On statin.  Stable.  Refill when needed.      Benign prostatic hyperplasia with urinary frequency  Stable on Cialis.  Refill when needed.    Macular degeneration (senile) of retina  Gets injections every month a month and a half.        No follow-ups on file.    Kurtis Mattson MD  [facility] ZAIN Diallo is a 82 year old accompanied by his spouse, presenting for the following health issues:  Establish Care    Pt is seen today with his wife as well, both are establishing care.    I did see Yoel once before regarding BPH.  He is stable on current medications.    He and his wife winter in [place].  He has arrangements to see a retinologist while down there for continuation of injections for macular degeneration.    He is quite active.  Retired  but still does a lot of remodeling and housework.  He was just up in [place] tiling a back splash for one of his sons.         Review of Systems         Objective    /67 (BP Location: Left arm, Patient Position: Sitting, Cuff Size: Adult Regular)   Pulse 67   Temp 98  F (36.7  C) (Tympanic)   Resp 20   Ht 1.803 m (5' 11\")   Wt 92.5 kg (203 lb 14.4 oz)   SpO2 96%   BMI 28.44 kg/m    Body mass index is 28.44 kg/m .  Physical Exam   General:  Awake, alert and NAD  HEENT:  NCAT, MMM  RESP:  No accessory muscle use, no audible wheezing.   ABD:  no pulsatile mass, non protuberant  EXT: non obvious C/C/E  PSYCH: Pleasant, conversant, makes eye contact                        "

## 2022-11-05 DIAGNOSIS — E78.5 HYPERLIPIDEMIA LDL GOAL <100: ICD-10-CM

## 2022-11-07 RX ORDER — ATORVASTATIN CALCIUM 40 MG/1
TABLET, FILM COATED ORAL
Qty: 90 TABLET | Refills: 1 | Status: SHIPPED | OUTPATIENT
Start: 2022-11-07 | End: 2023-05-23

## 2022-11-07 NOTE — TELEPHONE ENCOUNTER
Routing refill request to provider for review/approval because:  Labs not current:  LDL  Heena UMANA RN  [facility]

## 2022-11-10 ENCOUNTER — TRANSFERRED RECORDS (OUTPATIENT)
Dept: HEALTH INFORMATION MANAGEMENT | Facility: CLINIC | Age: 82
End: 2022-11-10

## 2023-04-17 DIAGNOSIS — N40.1 BENIGN PROSTATIC HYPERPLASIA WITH URINARY FREQUENCY: ICD-10-CM

## 2023-04-17 DIAGNOSIS — R35.0 BENIGN PROSTATIC HYPERPLASIA WITH URINARY FREQUENCY: ICD-10-CM

## 2023-04-17 DIAGNOSIS — N52.9 ERECTILE DYSFUNCTION, UNSPECIFIED ERECTILE DYSFUNCTION TYPE: ICD-10-CM

## 2023-04-17 RX ORDER — TADALAFIL 5 MG/1
5 TABLET ORAL DAILY
Qty: 90 TABLET | Refills: 0 | Status: SHIPPED | OUTPATIENT
Start: 2023-04-17 | End: 2023-09-01

## 2023-04-17 NOTE — TELEPHONE ENCOUNTER
CC: Patient calling to request a refill of the following:    tadalafil (CIALIS) 5 MG tablet    Pended refill and routed to refill pool     Renetta Chavarria RN  [facility]

## 2023-05-04 ENCOUNTER — TRANSFERRED RECORDS (OUTPATIENT)
Dept: HEALTH INFORMATION MANAGEMENT | Facility: CLINIC | Age: 83
End: 2023-05-04
Payer: COMMERCIAL

## 2023-05-23 DIAGNOSIS — E78.5 HYPERLIPIDEMIA LDL GOAL <100: ICD-10-CM

## 2023-05-23 RX ORDER — ATORVASTATIN CALCIUM 40 MG/1
40 TABLET, FILM COATED ORAL DAILY
Qty: 90 TABLET | Refills: 1 | Status: SHIPPED | OUTPATIENT
Start: 2023-05-23 | End: 2023-08-24

## 2023-08-21 NOTE — ED NOTES
Bed: B04A  Expected date: 8/20/23  Expected time: 7:56 PM  Means of arrival: [facility] (482)  Comments:  79F  Home  Fall on front step  3 1/2 in lac to L calf with fatty tissue showing  -thinners -hit head -loc  Takes asa q day  Hx htn  205/  64  20  95%     Bed: ED21  Expected date:   Expected time:   Means of arrival:   Comments:  Possible Stroke triage.

## 2023-08-24 DIAGNOSIS — E78.5 HYPERLIPIDEMIA LDL GOAL <100: ICD-10-CM

## 2023-08-24 RX ORDER — ATORVASTATIN CALCIUM 40 MG/1
40 TABLET, FILM COATED ORAL DAILY
Qty: 90 TABLET | Refills: 1 | Status: SHIPPED | OUTPATIENT
Start: 2023-08-24 | End: 2024-02-15

## 2023-09-01 DIAGNOSIS — R35.0 BENIGN PROSTATIC HYPERPLASIA WITH URINARY FREQUENCY: ICD-10-CM

## 2023-09-01 DIAGNOSIS — N40.1 BENIGN PROSTATIC HYPERPLASIA WITH URINARY FREQUENCY: ICD-10-CM

## 2023-09-01 DIAGNOSIS — N52.9 ERECTILE DYSFUNCTION, UNSPECIFIED ERECTILE DYSFUNCTION TYPE: ICD-10-CM

## 2023-09-01 RX ORDER — TADALAFIL 5 MG/1
5 TABLET ORAL DAILY
Qty: 90 TABLET | Refills: 1 | Status: SHIPPED | OUTPATIENT
Start: 2023-09-01 | End: 2024-08-27

## 2023-09-01 NOTE — TELEPHONE ENCOUNTER
Medication Question or Refill    Contacts         Type Contact Phone/Fax    09/01/2023 09:45 AM CDT Phone (Incoming) Yoel Stevenson (Self) [phone] (M)            What medication are you calling about (include dose and sig)?:     tadalafil (CIALIS) 5 MG tablet       Preferred Pharmacy:     [facility] [address]  [place] - [address]  [address]  [place] [address]  Phone: [phone] Fax: [phone]      Controlled Substance Agreement on file:   CSA -- Patient Level:    CSA: None found at the patient level.       Who prescribed the medication?: Patani    Do you need a refill? Yes    When did you use the medication last? Daily    Patient offered an appointment? No    Do you have any questions or concerns?  No      Okay to leave a detailed message?: Yes at Cell number on file:    Telephone Information:   Mobile [phone]

## 2023-10-25 ENCOUNTER — OFFICE VISIT (OUTPATIENT)
Dept: FAMILY MEDICINE | Facility: CLINIC | Age: 83
End: 2023-10-25
Payer: COMMERCIAL

## 2023-10-25 VITALS
HEIGHT: 71 IN | WEIGHT: 202 LBS | TEMPERATURE: 97.3 F | HEART RATE: 66 BPM | DIASTOLIC BLOOD PRESSURE: 84 MMHG | SYSTOLIC BLOOD PRESSURE: 147 MMHG | BODY MASS INDEX: 28.28 KG/M2 | OXYGEN SATURATION: 98 % | RESPIRATION RATE: 18 BRPM

## 2023-10-25 DIAGNOSIS — N52.9 ERECTILE DYSFUNCTION, UNSPECIFIED ERECTILE DYSFUNCTION TYPE: ICD-10-CM

## 2023-10-25 DIAGNOSIS — Z00.00 ENCOUNTER FOR MEDICARE ANNUAL WELLNESS EXAM: Primary | ICD-10-CM

## 2023-10-25 DIAGNOSIS — N40.1 BENIGN PROSTATIC HYPERPLASIA WITH URINARY FREQUENCY: ICD-10-CM

## 2023-10-25 DIAGNOSIS — R35.0 BENIGN PROSTATIC HYPERPLASIA WITH URINARY FREQUENCY: ICD-10-CM

## 2023-10-25 DIAGNOSIS — E78.5 HYPERLIPIDEMIA LDL GOAL <100: ICD-10-CM

## 2023-10-25 PROBLEM — Z96.651 STATUS POST RIGHT KNEE REPLACEMENT: Status: ACTIVE | Noted: 2018-07-10

## 2023-10-25 PROBLEM — M17.0 PRIMARY OSTEOARTHRITIS OF BOTH KNEES: Chronic | Status: ACTIVE | Noted: 2017-08-17

## 2023-10-25 LAB
ALBUMIN SERPL BCG-MCNC: 3.9 G/DL (ref 3.5–5.2)
ALP SERPL-CCNC: 53 U/L (ref 40–129)
ALT SERPL W P-5'-P-CCNC: 9 U/L (ref 0–70)
ANION GAP SERPL CALCULATED.3IONS-SCNC: 8 MMOL/L (ref 7–15)
AST SERPL W P-5'-P-CCNC: 23 U/L (ref 0–45)
BASOPHILS # BLD AUTO: 0 10E3/UL (ref 0–0.2)
BASOPHILS NFR BLD AUTO: 0 %
BILIRUB SERPL-MCNC: 0.6 MG/DL
BUN SERPL-MCNC: 19 MG/DL (ref 8–23)
CALCIUM SERPL-MCNC: 9.2 MG/DL (ref 8.8–10.2)
CHLORIDE SERPL-SCNC: 106 MMOL/L (ref 98–107)
CHOLEST SERPL-MCNC: 117 MG/DL
CREAT SERPL-MCNC: 0.97 MG/DL (ref 0.67–1.17)
DEPRECATED HCO3 PLAS-SCNC: 27 MMOL/L (ref 22–29)
EGFRCR SERPLBLD CKD-EPI 2021: 77 ML/MIN/1.73M2
EOSINOPHIL # BLD AUTO: 0 10E3/UL (ref 0–0.7)
EOSINOPHIL NFR BLD AUTO: 1 %
ERYTHROCYTE [DISTWIDTH] IN BLOOD BY AUTOMATED COUNT: 14.2 % (ref 10–15)
GLUCOSE SERPL-MCNC: 96 MG/DL (ref 70–99)
HCT VFR BLD AUTO: 45 % (ref 40–53)
HDLC SERPL-MCNC: 42 MG/DL
HGB BLD-MCNC: 14.2 G/DL (ref 13.3–17.7)
IMM GRANULOCYTES # BLD: 0 10E3/UL
IMM GRANULOCYTES NFR BLD: 0 %
LDLC SERPL CALC-MCNC: 63 MG/DL
LYMPHOCYTES # BLD AUTO: 2.1 10E3/UL (ref 0.8–5.3)
LYMPHOCYTES NFR BLD AUTO: 37 %
MCH RBC QN AUTO: 29.8 PG (ref 26.5–33)
MCHC RBC AUTO-ENTMCNC: 31.6 G/DL (ref 31.5–36.5)
MCV RBC AUTO: 94 FL (ref 78–100)
MONOCYTES # BLD AUTO: 0.7 10E3/UL (ref 0–1.3)
MONOCYTES NFR BLD AUTO: 11 %
NEUTROPHILS # BLD AUTO: 3 10E3/UL (ref 1.6–8.3)
NEUTROPHILS NFR BLD AUTO: 51 %
NONHDLC SERPL-MCNC: 75 MG/DL
PLATELET # BLD AUTO: 187 10E3/UL (ref 150–450)
POTASSIUM SERPL-SCNC: 4.6 MMOL/L (ref 3.4–5.3)
PROT SERPL-MCNC: 6.8 G/DL (ref 6.4–8.3)
RBC # BLD AUTO: 4.77 10E6/UL (ref 4.4–5.9)
SODIUM SERPL-SCNC: 141 MMOL/L (ref 135–145)
TRIGL SERPL-MCNC: 58 MG/DL
TSH SERPL DL<=0.005 MIU/L-ACNC: 2.85 UIU/ML (ref 0.3–4.2)
WBC # BLD AUTO: 5.7 10E3/UL (ref 4–11)

## 2023-10-25 PROCEDURE — 85025 COMPLETE CBC W/AUTO DIFF WBC: CPT | Performed by: INTERNAL MEDICINE

## 2023-10-25 PROCEDURE — 36415 COLL VENOUS BLD VENIPUNCTURE: CPT | Performed by: INTERNAL MEDICINE

## 2023-10-25 PROCEDURE — 80061 LIPID PANEL: CPT | Performed by: INTERNAL MEDICINE

## 2023-10-25 PROCEDURE — G0439 PPPS, SUBSEQ VISIT: HCPCS | Performed by: INTERNAL MEDICINE

## 2023-10-25 PROCEDURE — 90662 IIV NO PRSV INCREASED AG IM: CPT | Performed by: INTERNAL MEDICINE

## 2023-10-25 PROCEDURE — 84443 ASSAY THYROID STIM HORMONE: CPT | Performed by: INTERNAL MEDICINE

## 2023-10-25 PROCEDURE — G0008 ADMIN INFLUENZA VIRUS VAC: HCPCS | Performed by: INTERNAL MEDICINE

## 2023-10-25 PROCEDURE — 99213 OFFICE O/P EST LOW 20 MIN: CPT | Mod: 25 | Performed by: INTERNAL MEDICINE

## 2023-10-25 PROCEDURE — 80053 COMPREHEN METABOLIC PANEL: CPT | Performed by: INTERNAL MEDICINE

## 2023-10-25 RX ORDER — TAMSULOSIN HYDROCHLORIDE 0.4 MG/1
0.4 CAPSULE ORAL DAILY
Qty: 30 CAPSULE | Refills: 0 | Status: SHIPPED | OUTPATIENT
Start: 2023-10-25 | End: 2023-11-16

## 2023-10-25 ASSESSMENT — ACTIVITIES OF DAILY LIVING (ADL): CURRENT_FUNCTION: NO ASSISTANCE NEEDED

## 2023-10-25 ASSESSMENT — PAIN SCALES - GENERAL: PAINLEVEL: NO PAIN (0)

## 2023-10-25 NOTE — LETTER
"October 25, 2023      Yoel Stevenson  [address]  [place] [address]        Dear ,    We are writing to inform you of your test results.  Yoel Zuleta!     Your results are in.  Everything looks good.  There are some values that may be slightly out of the \"normal\" range, but are not worrisome.     Let me know if you have any questions.  If there's a particular blood test result you're concerned about please schedule a follow up appointment at your convenience.       Resulted Orders   Comprehensive metabolic panel   Result Value Ref Range    Sodium 141 135 - 145 mmol/L      Comment:      Reference intervals for this test were updated on 09/26/2023 to more accurately reflect our healthy population. There may be differences in the flagging of prior results with similar values performed with this method. Interpretation of those prior results can be made in the context of the updated reference intervals.     Potassium 4.6 3.4 - 5.3 mmol/L    Carbon Dioxide (CO2) 27 22 - 29 mmol/L    Anion Gap 8 7 - 15 mmol/L    Urea Nitrogen 19.0 8.0 - 23.0 mg/dL    Creatinine 0.97 0.67 - 1.17 mg/dL    GFR Estimate 77 >60 mL/min/1.73m2    Calcium 9.2 8.8 - 10.2 mg/dL    Chloride 106 98 - 107 mmol/L    Glucose 96 70 - 99 mg/dL    Alkaline Phosphatase 53 40 - 129 U/L    AST 23 0 - 45 U/L      Comment:      Reference intervals for this test were updated on 6/12/2023 to more accurately reflect our healthy population. There may be differences in the flagging of prior results with similar values performed with this method. Interpretation of those prior results can be made in the context of the updated reference intervals.    ALT 9 0 - 70 U/L      Comment:      Reference intervals for this test were updated on 6/12/2023 to more accurately reflect our healthy population. There may be differences in the flagging of prior results with similar values performed with this method. Interpretation of those prior results can be made " in the context of the updated reference intervals.      Protein Total 6.8 6.4 - 8.3 g/dL    Albumin 3.9 3.5 - 5.2 g/dL    Bilirubin Total 0.6 <=1.2 mg/dL   TSH with free T4 reflex   Result Value Ref Range    TSH 2.85 0.30 - 4.20 uIU/mL   Lipid panel reflex to direct LDL Fasting   Result Value Ref Range    Cholesterol 117 <200 mg/dL    Triglycerides 58 <150 mg/dL    Direct Measure HDL 42 >=40 mg/dL    LDL Cholesterol Calculated 63 <=100 mg/dL    Non HDL Cholesterol 75 <130 mg/dL    Narrative    Cholesterol  Desirable:  <200 mg/dL    Triglycerides  Normal:  Less than 150 mg/dL  Borderline High:  150-199 mg/dL  High:  200-499 mg/dL  Very High:  Greater than or equal to 500 mg/dL    Direct Measure HDL  Female:  Greater than or equal to 50 mg/dL   Male:  Greater than or equal to 40 mg/dL    LDL Cholesterol  Desirable:  <100mg/dL  Above Desirable:  100-129 mg/dL   Borderline High:  130-159 mg/dL   High:  160-189 mg/dL   Very High:  >= 190 mg/dL    Non HDL Cholesterol  Desirable:  130 mg/dL  Above Desirable:  130-159 mg/dL  Borderline High:  160-189 mg/dL  High:  190-219 mg/dL  Very High:  Greater than or equal to 220 mg/dL   CBC with platelets and differential   Result Value Ref Range    WBC Count 5.7 4.0 - 11.0 10e3/uL    RBC Count 4.77 4.40 - 5.90 10e6/uL    Hemoglobin 14.2 13.3 - 17.7 g/dL    Hematocrit 45.0 40.0 - 53.0 %    MCV 94 78 - 100 fL    MCH 29.8 26.5 - 33.0 pg    MCHC 31.6 31.5 - 36.5 g/dL    RDW 14.2 10.0 - 15.0 %    Platelet Count 187 150 - 450 10e3/uL    % Neutrophils 51 %    % Lymphocytes 37 %    % Monocytes 11 %    % Eosinophils 1 %    % Basophils 0 %    % Immature Granulocytes 0 %    Absolute Neutrophils 3.0 1.6 - 8.3 10e3/uL    Absolute Lymphocytes 2.1 0.8 - 5.3 10e3/uL    Absolute Monocytes 0.7 0.0 - 1.3 10e3/uL    Absolute Eosinophils 0.0 0.0 - 0.7 10e3/uL    Absolute Basophils 0.0 0.0 - 0.2 10e3/uL    Absolute Immature Granulocytes 0.0 <=0.4 10e3/uL       If you have any questions or concerns,  please call the clinic at the number listed above.       Sincerely,      Kurtis Mattson MD

## 2023-10-25 NOTE — PROGRESS NOTES
"SUBJECTIVE:   Yoel is a 83 year old who presents for Preventive Visit.    Pt notes nocturia.  Known BPH.  Tried alfuzosin in 8.22.  stopped after 1 dose due to dizziness.    Takes cialis daily.  Still has nocturia 2-5 times a night.        Are you in the first 12 months of your Medicare coverage?  No    Healthy Habits:     In general, how would you rate your overall health?  Good    Frequency of exercise:  4-5 days/week    Duration of exercise:  45-60 minutes    Do you usually eat at least 4 servings of fruit and vegetables a day, include whole grains    & fiber and avoid regularly eating high fat or \"junk\" foods?  Yes    Taking medications regularly:  Yes    Medication side effects:  Not applicable and None    Ability to successfully perform activities of daily living:  No assistance needed    Home Safety:  No safety concerns identified    Hearing Impairment:  No hearing concerns    In the past 6 months, have you been bothered by leaking of urine?  No    In general, how would you rate your overall mental or emotional health?  Excellent      Today's PHQ-2 Score:       10/25/2023     8:23 AM   PHQ-2 ( 1999 Pf[employer])   Q1: Little interest or pleasure in doing things 0   Q2: Feeling down, depressed or hopeless 0   PHQ-2 Score 0   Q1: Little interest or pleasure in doing things Not at all   Q2: Feeling down, depressed or hopeless Not at all   PHQ-2 Score 0       Have you ever done Advance Care Planning? (For example, a Health Directive, POLST, or a discussion with a medical provider or your loved ones about your wishes): Yes, advance care planning is on file.     Fall risk  Fallen 2 or more times in the past year?: No  Any fall with injury in the past year?: No    Cognitive Screening   1) Repeat 3 items (Leader, Season, Table)    2) Clock draw: NORMAL  3) 3 item recall: Recalls 2 objects   Results: NORMAL clock, 1-2 items recalled: COGNITIVE IMPAIRMENT LESS LIKELY    Mini-CogTM Copyright [employer]. Licensed by the author " for use in [facility]; reprinted with permission (sangeeta@[facility]). All rights reserved.        Reviewed and updated as needed this visit by clinical staff   Tobacco  Allergies  Meds              Reviewed and updated as needed this visit by Provider                 Social History     Tobacco Use    Smoking status: Never    Smokeless tobacco: Never   Substance Use Topics    Alcohol use: Yes     Comment: minimal             10/25/2023     8:23 AM   Alcohol Use   Prescreen: >3 drinks/day or >7 drinks/week? Yes   AUDIT SCORE  2         10/25/2023     8:23 AM   AUDIT - Alcohol Use Disorders Identification Test - Reproduced from the World Health Organization Audit 2001 (Second Edition)   1.  How often do you have a drink containing alcohol? 2 to 4 times a month   2.  How many drinks containing alcohol do you have on a typical day when you are drinking? 1 or 2   3.  How often do you have five or more drinks on one occasion? Never   4.  How often during the last year have you found that you were not able to stop drinking once you had started? Never   5.  How often during the last year have you failed to do what was normally expected of you because of drinking? Never   6.  How often during the last year have you needed a first drink in the morning to get yourself going after a heavy drinking session? Never   7.  How often during the last year have you had a feeling of guilt or remorse after drinking? Never   8.  How often during the last year have you been unable to remember what happened the night before because of your drinking? Never   9.  Have you or someone else been injured because of your drinking? No   10. Has a relative, friend, doctor or other health care worker been concerned about your drinking or suggested you cut down? No   TOTAL SCORE 2     Do you have a current opioid prescription? No  Do you use any other controlled substances or medications that are not prescribed by a provider?  "None              Current providers sharing in care for this patient include:   Patient Care Team:  Kurtis Mattson MD as Assigned PCP    The following health maintenance items are reviewed in Epic and correct as of today:  Health Maintenance   Topic Date Due    RSV VACCINE 60+ (1 - 1-dose 60+ series) Never done    INFLUENZA VACCINE (1) 09/01/2023    COVID-19 Vaccine (5 - 2023-24 season) 09/01/2023    ANNUAL REVIEW OF HM ORDERS  10/04/2023    MEDICARE ANNUAL WELLNESS VISIT  10/25/2024    FALL RISK ASSESSMENT  10/25/2024    ADVANCE CARE PLANNING  10/25/2028    DTAP/TDAP/TD IMMUNIZATION (3 - Td or Tdap) 07/27/2030    PHQ-2 (once per calendar year)  Completed    Pneumococcal Vaccine: 65+ Years  Completed    ZOSTER IMMUNIZATION  Completed    IPV IMMUNIZATION  Aged Out    HPV IMMUNIZATION  Aged Out    MENINGITIS IMMUNIZATION  Aged Out               Review of Systems      OBJECTIVE:   BP (!) 147/84   Pulse 66   Temp 97.3  F (36.3  C)   Resp 18   Ht 1.803 m (5' 11\")   Wt 91.6 kg (202 lb)   SpO2 98%   BMI 28.17 kg/m   Estimated body mass index is 28.17 kg/m  as calculated from the following:    Height as of this encounter: 1.803 m (5' 11\").    Weight as of this encounter: 91.6 kg (202 lb).  Physical Exam  GENERAL: healthy, alert and no distress  NECK: no adenopathy, no asymmetry, masses, or scars and thyroid normal to palpation  RESP: lungs clear to auscultation - no rales, rhonchi or wheezes  CV: regular rate and rhythm, normal S1 S2, no S3 or S4, no murmur, click or rub, no peripheral edema and peripheral pulses strong  ABDOMEN: soft, nontender, no hepatosplenomegaly, no masses and bowel sounds normal  MS: no gross musculoskeletal defects noted, no edema        ASSESSMENT / PLAN:       ICD-10-CM    1. Encounter for Medicare annual wellness exam   Age and gender appropriate preventive care and screenings are discussed.  Particular attention to personal preventive care and age appropriate lifestyle including the " "incorporation of healthy diet and physical activity is made.     Z00.00       2. Hyperlipidemia LDL goal <100   On statin. Lipids today E78.5 CBC with Platelets & Differential     Comprehensive metabolic panel     TSH with free T4 reflex     Lipid panel reflex to direct LDL Fasting      3. Erectile dysfunction, unspecified erectile dysfunction type   Takes Cialis.   N52.9 CBC with Platelets & Differential     Comprehensive metabolic panel     TSH with free T4 reflex     Lipid panel reflex to direct LDL Fasting      4. Benign prostatic hyperplasia with urinary frequency   Had dizziness with alfuzosin in the past.  Can trial flomax.  Risks and SE discussed.    N40.1 tamsulosin (FLOMAX) 0.4 MG capsule    R35.0             COUNSELING:  Reviewed preventive health counseling, as reflected in patient instructions      BMI:   Estimated body mass index is 28.17 kg/m  as calculated from the following:    Height as of this encounter: 1.803 m (5' 11\").    Weight as of this encounter: 91.6 kg (202 lb).         He reports that he has never smoked. He has never used smokeless tobacco.      Appropriate preventive services were discussed with this patient, including applicable screening as appropriate for fall prevention, nutrition, physical activity, Tobacco-use cessation, weight loss and cognition.  Checklist reviewing preventive services available has been given to the patient.    Reviewed patients plan of care and provided an AVS. The Basic Care Plan (routine screening as documented in Health Maintenance) for Yoel meets the Care Plan requirement. This Care Plan has been established and reviewed with the Patient.          Kurtis Mattson MD  [facility]    Identified Health Risks:  I have reviewed Opioid Use Disorder and Substance Use Disorder risk factors and made any needed referrals.   "

## 2023-10-25 NOTE — PATIENT INSTRUCTIONS
Flu shot today    Labs today    Get an RSV vaccine from your pharmacy- wait at least 2 weeks from today    TAMSULOSIN:  Every night.  This is for prostate/urination.  Take nightly for a week to see if it helps.  IF you experience side effects can stop.      Patient Education   Personalized Prevention Plan  You are due for the preventive services outlined below.  Your care team is available to assist you in scheduling these services.  If you have already completed any of these items, please share that information with your care team to update in your medical record.  Health Maintenance Due   Topic Date Due    RSV VACCINE 60+ (1 - 1-dose 60+ series) Never done    Flu Vaccine (1) 09/01/2023    COVID-19 Vaccine (5 - 2023-24 season) 09/01/2023    ANNUAL REVIEW OF HM ORDERS  10/04/2023

## 2023-11-02 ENCOUNTER — TRANSFERRED RECORDS (OUTPATIENT)
Dept: HEALTH INFORMATION MANAGEMENT | Facility: CLINIC | Age: 83
End: 2023-11-02
Payer: COMMERCIAL

## 2023-11-16 DIAGNOSIS — R35.0 BENIGN PROSTATIC HYPERPLASIA WITH URINARY FREQUENCY: ICD-10-CM

## 2023-11-16 DIAGNOSIS — N40.1 BENIGN PROSTATIC HYPERPLASIA WITH URINARY FREQUENCY: ICD-10-CM

## 2023-11-16 RX ORDER — TAMSULOSIN HYDROCHLORIDE 0.4 MG/1
0.4 CAPSULE ORAL DAILY
Qty: 90 CAPSULE | Refills: 1 | Status: SHIPPED | OUTPATIENT
Start: 2023-11-16 | End: 2024-05-14

## 2023-11-21 ENCOUNTER — NURSE TRIAGE (OUTPATIENT)
Dept: NURSING | Facility: CLINIC | Age: 83
End: 2023-11-21
Payer: COMMERCIAL

## 2023-11-21 DIAGNOSIS — U07.1 INFECTION DUE TO 2019 NOVEL CORONAVIRUS: Primary | ICD-10-CM

## 2023-11-21 NOTE — TELEPHONE ENCOUNTER
Called pt and verified he has NOT tested positive yet.     According to pt the previous nurse had stated if he had been around someone who had tested positive he would qualify for treatment and I informed pt that was unfortunately incorrect and he would have to test positive either through a PCR or home test before initiating treatment.     Pt agreed to test and call back if positive.    Deedee Cotton RN  [employer]

## 2023-11-21 NOTE — TELEPHONE ENCOUNTER
Per chart review, patient has already been triaged for symptoms (writer did not speak with patient or triage patient - simply routing encounter to appropriate pool as this was routed to  triage)    Routing to [facility] to address - patient requesting antiviral treatment - thank you!     Renetta Chavarria RN  [facility]

## 2023-11-21 NOTE — TELEPHONE ENCOUNTER
COVID Positive/Requesting COVID treatment    Patient is positive for COVID and requesting treatment options.    Date of positive COVID test (PCR or at home)? Pt did not test, but had a close contact exposure on Saturday. Daughter was over for the evening to have dinner and tested positive for Covid the next day. Pt does not have a test at home.  Current COVID symptoms: fever or chills, cough, fatigue, muscle or body aches, and congestion or runny nose  Date COVID symptoms began: 11/20/23    Message should be routed to clinic RN pool. Best phone number to use for call back: [phone].    Yessica Bunn, RN, BSN  [facility]   Triage Nurse Advisor    Reason for Disposition   [1] HIGH RISK patient (e.g., weak immune system, age > 64 years, obesity with BMI 30 or higher, pregnant, chronic lung disease or other chronic medical condition) AND [2] COVID symptoms (e.g., cough, fever)  (Exceptions: Already seen by PCP and no new or worsening symptoms.)    Additional Information   Negative: SEVERE difficulty breathing (e.g., struggling for each breath, speaks in single words)   Negative: Difficult to awaken or acting confused (e.g., disoriented, slurred speech)   Negative: Bluish (or gray) lips or face now   Negative: Shock suspected (e.g., cold/pale/clammy skin, too weak to stand, low BP, rapid pulse)   Negative: Sounds like a life-threatening emergency to the triager   Negative: [1] Diagnosed or suspected COVID-19 AND [2] symptoms lasting 3 or more weeks   Negative: [1] COVID-19 exposure AND [2] no symptoms   Negative: COVID-19 vaccine reaction suspected (e.g., fever, headache, muscle aches) occurring 1 to 3 days after getting vaccine   Negative: COVID-19 vaccine, questions about   Negative: [1] Lives with someone known to have influenza (flu test positive) AND [2] flu-like symptoms (e.g., cough, runny nose, sore throat, SOB; with or without fever)   Negative: [1] Possible COVID-19 symptoms AND [2] triager  concerned about severity of symptoms or other causes   Negative: COVID-19 and breastfeeding, questions about   Negative: SEVERE or constant chest pain or pressure  (Exception: Mild central chest pain, present only when coughing.)   Negative: MODERATE difficulty breathing (e.g., speaks in phrases, SOB even at rest, pulse 100-120)   Negative: [1] Headache AND [2] stiff neck (can't touch chin to chest)   Negative: Oxygen level (e.g., pulse oximetry) 90 percent or lower   Negative: Chest pain or pressure  (Exception: MILD central chest pain, present only when coughing.)   Negative: [1] Drinking very little AND [2] dehydration suspected (e.g., no urine > 12 hours, very dry mouth, very lightheaded)   Negative: Patient sounds very sick or weak to the triager   Negative: MILD difficulty breathing (e.g., minimal/no SOB at rest, SOB with walking, pulse <100)   Negative: Fever > 103 F (39.4 C)   Negative: [1] Fever > 101 F (38.3 C) AND [2] age > 60 years   Negative: [1] Fever > 100.0 F (37.8 C) AND [2] bedridden (e.g., CVA, chronic illness, recovering from surgery)   Negative: Oxygen level (e.g., pulse oximetry) 91 to 94 percent    Protocols used: Coronavirus (COVID-19) Diagnosed or Deeeudika-V-PH

## 2023-11-21 NOTE — TELEPHONE ENCOUNTER
RN COVID TREATMENT VISIT  11/21/23    The patient has been triaged and does not require a higher level of care.    Yoel Stevenson  83 year old  Current weight? 202 lb    Has the patient been seen by a primary care provider at an [facility] or [facility] within the past two years? Yes.   Have you been in close proximity to/do you have a known exposure to a person with a confirmed case of influenza? No.     General treatment eligibility:  Date of positive COVID test (PCR or at home)?  11/21/23    Are you or have you been hospitalized for this COVID-19 infection? No.   Have you received monoclonal antibodies or antiviral treatment for COVID-19 since this positive test? No.   Do you have any of the following conditions that place you at risk of being very sick from COVID-19?   - Age 50 years or older  Yes, patient has at least one high risk condition as noted above.     Current COVID symptoms:   - fever or chills  - cough  - fatigue  - muscle or body aches  - congestion or runny nose  Yes. Patient has at least one symptom as selected.     How many days since symptoms started? 5 days or less. Established patient, 12 years or older weighing at least 88.2 lbs, who has symptoms that started in the past 5 days, has not been hospitalized nor received treatment already, and is at risk for being very sick from COVID-19.     Treatment eligibility by RN:  Are you currently pregnant or nursing? No  Do you have a clinically significant hypersensitivity to nirmatrelvir or ritonavir, or toxic epidermal necrolysis (TEN) or Lara-Abdullahi Syndrome? No  Do you have a history of hepatitis, any hepatic impairment on the Problem List (such as Child-Stanley Class C, cirrhosis, fatty liver disease, alcoholic liver disease), or was the last liver lab (hepatic panel, ALT, AST, ALK Phos, bilirubin) elevated in the past 6 months? No  Do you have any history of severe renal impairment (eGFR < 30mL/min)? No    Is  patient eligible to continue? Yes, patient meets all eligibility requirements for the RN COVID treatment (as denoted by all no responses above).     Current Outpatient Medications   Medication Sig Dispense Refill    acetaminophen (TYLENOL) 325 MG tablet Take 650 mg by mouth every 6 hours as needed for mild pain      aspirin 81 MG EC tablet Take 81 mg by mouth      atorvastatin (LIPITOR) 40 MG tablet TAKE 1 TABLET BY MOUTH EVERY DAY 90 tablet 1    multivitamin  with lutein (OCUVITE WITH LTEIN) CAPS per capsule Take 1 capsule by mouth daily      tadalafil (CIALIS) 5 MG tablet Take 1 tablet (5 mg) by mouth daily 90 tablet 1    tamsulosin (FLOMAX) 0.4 MG capsule TAKE 1 CAPSULE BY MOUTH EVERY DAY 90 capsule 1       Medications from List 1 of the standing order (on medications that exclude the use of Paxlovid) that patient is taking: NONE. Is patient taking Jourdan's Wort? No  Is patient taking Jourdan's Wort or any meds from List 1? No.   Medications from List 2 of the standing order (on meds that provider needs to adjust) that patient is taking: NONE. Is patient on any of the meds from List 2? No.   Medications from List 3 of standing order (on meds that a RN needs to adjust) that patient is taking: atorvastatin (Lipitor): Instructed patient to stop atorvastatin while taking Paxlovid and restart atorvastatin 1 day after the completion of Paxlovid.  Is patient on any meds from List 3? Yes. Patient is on meds from list 3. No meds require a provider visit and at least one med required RN to adjust.     Paxlovid has an approximate 90% reduction in hospitalization. Paxlovid can possibly cause altered sense of taste, diarrhea (loose, watery stools), high blood pressure, muscle aches.     Would patient like a Paxlovid prescription?   Yes.   Lab Results   Component Value Date    GFRESTIMATED 77 10/25/2023       Was last eGFR reduced? No, eGFR 60 or greater/ No Result on record. Patient can receive the normal renal function  dose. Paxlovid Rx sent to [facility]   [facility]    Temporary change to home medications: See above.    All medication adjustments (holds, etc) were discussed with the patient and patient was asked to repeat back (teachback) their med adjustment.  Did patient understand med adjustment? Yes, patient repeated back and understood correctly.        Reviewed the following instructions with the patient:    Paxlovid (nimatrelvir and ritonavir)    How it works  Two medicines (nirmatrelvir and ritonavir) are taken together. They stop the virus from growing. Less amount of virus is easier for your body to fight.    How to take  Medicine comes in a daily container with both medicine tablets. Take by mouth twice daily (once in the morning, once at night) for 5 days.  The number of tablets to take varies by patient.  Don't chew or break capsules. Swallow whole.    When to take  Take as soon as possible after positive COVID-19 test result, and within 5 days of your first symptoms.    Possible side effects  Can cause altered sense of taste, diarrhea (loose, watery stools), high blood pressure, muscle aches.    Deedee Cotton RN

## 2023-11-21 NOTE — TELEPHONE ENCOUNTER
Pt called the clinic back stating he tested positive today.     Routing to [place] [id number] treatment team to review and advise.     Per chart review- pt was already triaged today.

## 2024-02-15 DIAGNOSIS — E78.5 HYPERLIPIDEMIA LDL GOAL <100: ICD-10-CM

## 2024-02-15 RX ORDER — ATORVASTATIN CALCIUM 40 MG/1
40 TABLET, FILM COATED ORAL DAILY
Qty: 90 TABLET | Refills: 1 | Status: SHIPPED | OUTPATIENT
Start: 2024-02-15 | End: 2024-08-12

## 2024-03-11 ENCOUNTER — TRANSFERRED RECORDS (OUTPATIENT)
Dept: HEALTH INFORMATION MANAGEMENT | Facility: CLINIC | Age: 84
End: 2024-03-11

## 2024-03-11 LAB — EJECTION FRACTION: NORMAL %

## 2024-05-14 DIAGNOSIS — R35.0 BENIGN PROSTATIC HYPERPLASIA WITH URINARY FREQUENCY: ICD-10-CM

## 2024-05-14 DIAGNOSIS — N40.1 BENIGN PROSTATIC HYPERPLASIA WITH URINARY FREQUENCY: ICD-10-CM

## 2024-05-14 RX ORDER — TAMSULOSIN HYDROCHLORIDE 0.4 MG/1
0.4 CAPSULE ORAL DAILY
Qty: 90 CAPSULE | Refills: 1 | Status: SHIPPED | OUTPATIENT
Start: 2024-05-14 | End: 2024-08-27

## 2024-06-06 ENCOUNTER — TRANSFERRED RECORDS (OUTPATIENT)
Dept: HEALTH INFORMATION MANAGEMENT | Facility: CLINIC | Age: 84
End: 2024-06-06
Payer: COMMERCIAL

## 2024-08-10 DIAGNOSIS — E78.5 HYPERLIPIDEMIA LDL GOAL <100: ICD-10-CM

## 2024-08-12 RX ORDER — ATORVASTATIN CALCIUM 40 MG/1
40 TABLET, FILM COATED ORAL DAILY
Qty: 90 TABLET | Refills: 0 | Status: SHIPPED | OUTPATIENT
Start: 2024-08-12 | End: 2024-08-27

## 2024-08-27 ENCOUNTER — OFFICE VISIT (OUTPATIENT)
Dept: FAMILY MEDICINE | Facility: CLINIC | Age: 84
End: 2024-08-27
Payer: COMMERCIAL

## 2024-08-27 VITALS
OXYGEN SATURATION: 95 % | TEMPERATURE: 97.7 F | WEIGHT: 194.6 LBS | HEART RATE: 54 BPM | BODY MASS INDEX: 28.82 KG/M2 | DIASTOLIC BLOOD PRESSURE: 81 MMHG | HEIGHT: 69 IN | RESPIRATION RATE: 16 BRPM | SYSTOLIC BLOOD PRESSURE: 128 MMHG

## 2024-08-27 DIAGNOSIS — R01.1 HEART MURMUR: ICD-10-CM

## 2024-08-27 DIAGNOSIS — R35.0 BENIGN PROSTATIC HYPERPLASIA WITH URINARY FREQUENCY: ICD-10-CM

## 2024-08-27 DIAGNOSIS — N52.9 ERECTILE DYSFUNCTION, UNSPECIFIED ERECTILE DYSFUNCTION TYPE: ICD-10-CM

## 2024-08-27 DIAGNOSIS — Z00.00 ENCOUNTER FOR MEDICARE ANNUAL WELLNESS EXAM: Primary | ICD-10-CM

## 2024-08-27 DIAGNOSIS — E78.5 HYPERLIPIDEMIA LDL GOAL <70: ICD-10-CM

## 2024-08-27 DIAGNOSIS — N40.1 BENIGN PROSTATIC HYPERPLASIA WITH URINARY FREQUENCY: ICD-10-CM

## 2024-08-27 PROBLEM — Z86.73 HISTORY OF STROKE: Status: ACTIVE | Noted: 2020-08-01

## 2024-08-27 LAB
ANION GAP SERPL CALCULATED.3IONS-SCNC: 6 MMOL/L (ref 7–15)
BUN SERPL-MCNC: 13.7 MG/DL (ref 8–23)
CALCIUM SERPL-MCNC: 9.3 MG/DL (ref 8.8–10.4)
CHLORIDE SERPL-SCNC: 104 MMOL/L (ref 98–107)
CHOLEST SERPL-MCNC: 123 MG/DL
CREAT SERPL-MCNC: 0.96 MG/DL (ref 0.67–1.17)
EGFRCR SERPLBLD CKD-EPI 2021: 78 ML/MIN/1.73M2
ERYTHROCYTE [DISTWIDTH] IN BLOOD BY AUTOMATED COUNT: 13.9 % (ref 10–15)
FASTING STATUS PATIENT QL REPORTED: YES
FASTING STATUS PATIENT QL REPORTED: YES
GLUCOSE SERPL-MCNC: 95 MG/DL (ref 70–99)
HBA1C MFR BLD: 5.3 % (ref 0–5.6)
HCO3 SERPL-SCNC: 30 MMOL/L (ref 22–29)
HCT VFR BLD AUTO: 45.5 % (ref 40–53)
HDLC SERPL-MCNC: 41 MG/DL
HGB BLD-MCNC: 14.6 G/DL (ref 13.3–17.7)
LDLC SERPL CALC-MCNC: 64 MG/DL
MCH RBC QN AUTO: 30 PG (ref 26.5–33)
MCHC RBC AUTO-ENTMCNC: 32.1 G/DL (ref 31.5–36.5)
MCV RBC AUTO: 94 FL (ref 78–100)
NONHDLC SERPL-MCNC: 82 MG/DL
PLATELET # BLD AUTO: 198 10E3/UL (ref 150–450)
POTASSIUM SERPL-SCNC: 4.6 MMOL/L (ref 3.4–5.3)
RBC # BLD AUTO: 4.86 10E6/UL (ref 4.4–5.9)
SODIUM SERPL-SCNC: 140 MMOL/L (ref 135–145)
TRIGL SERPL-MCNC: 90 MG/DL
WBC # BLD AUTO: 5.4 10E3/UL (ref 4–11)

## 2024-08-27 PROCEDURE — 80061 LIPID PANEL: CPT | Performed by: INTERNAL MEDICINE

## 2024-08-27 PROCEDURE — 36415 COLL VENOUS BLD VENIPUNCTURE: CPT | Performed by: INTERNAL MEDICINE

## 2024-08-27 PROCEDURE — 85027 COMPLETE CBC AUTOMATED: CPT | Mod: GZ | Performed by: INTERNAL MEDICINE

## 2024-08-27 PROCEDURE — 80048 BASIC METABOLIC PNL TOTAL CA: CPT | Performed by: INTERNAL MEDICINE

## 2024-08-27 PROCEDURE — G0439 PPPS, SUBSEQ VISIT: HCPCS | Performed by: INTERNAL MEDICINE

## 2024-08-27 PROCEDURE — 83036 HEMOGLOBIN GLYCOSYLATED A1C: CPT | Mod: GZ | Performed by: INTERNAL MEDICINE

## 2024-08-27 PROCEDURE — 99214 OFFICE O/P EST MOD 30 MIN: CPT | Mod: 25 | Performed by: INTERNAL MEDICINE

## 2024-08-27 RX ORDER — ATORVASTATIN CALCIUM 40 MG/1
40 TABLET, FILM COATED ORAL DAILY
Qty: 90 TABLET | Refills: 3 | Status: SHIPPED | OUTPATIENT
Start: 2024-08-27

## 2024-08-27 RX ORDER — TADALAFIL 5 MG/1
5 TABLET ORAL DAILY
Qty: 90 TABLET | Refills: 3 | Status: SHIPPED | OUTPATIENT
Start: 2024-08-27 | End: 2024-08-27

## 2024-08-27 RX ORDER — TADALAFIL 5 MG/1
5 TABLET ORAL DAILY
Qty: 90 TABLET | Refills: 3 | Status: SHIPPED | OUTPATIENT
Start: 2024-08-27 | End: 2024-08-28

## 2024-08-27 SDOH — HEALTH STABILITY: PHYSICAL HEALTH: ON AVERAGE, HOW MANY DAYS PER WEEK DO YOU ENGAGE IN MODERATE TO STRENUOUS EXERCISE (LIKE A BRISK WALK)?: 6 DAYS

## 2024-08-27 ASSESSMENT — SOCIAL DETERMINANTS OF HEALTH (SDOH): HOW OFTEN DO YOU GET TOGETHER WITH FRIENDS OR RELATIVES?: MORE THAN THREE TIMES A WEEK

## 2024-08-27 ASSESSMENT — PAIN SCALES - GENERAL: PAINLEVEL: NO PAIN (0)

## 2024-08-27 NOTE — PROGRESS NOTES
"Preventive Care Visit  [facility]  Raymon Siu MD, Internal Medicine  Aug 27, 2024      Assessment & Plan     Encounter for Medicare annual wellness exam    - Basic metabolic panel; Future  - CBC with platelets; Future  - Lipid panel reflex to direct LDL Fasting; Future  - HEMOGLOBIN A1C; Future    Hyperlipidemia LDL goal <70  On statin therapy; recheck lipids  - atorvastatin (LIPITOR) 40 MG tablet; Take 1 tablet (40 mg) by mouth daily.  - OFFICE/OUTPT VISIT,EST,LEVL IV    Benign prostatic hyperplasia with urinary frequency  Complains of 4 x nocturia  Failed flomax due to dizziness side effects   Discussed options including proscar, TURP  He would like to try daily cialis and see urology if that does not help enough   - Adult Urology  Referral; Future  - tadalafil (CIALIS) 5 MG tablet; Take 1 tablet (5 mg) by mouth daily.  - OFFICE/OUTPT VISIT,EST,LEVL IV    Erectile dysfunction, unspecified erectile dysfunction type  See above   - tadalafil (CIALIS) 5 MG tablet; Take 1 tablet (5 mg) by mouth daily.  - OFFICE/OUTPT VISIT,EST,LEVL IV    Heart murmur  Faint systolic murmur noted  He had Echo in [place] and saw a cardiologist  He thinks he was told 'not to worry about it'  I will do my best to get records  I also advise an echo after next annual check in 12 months or sooner pending symptoms     Patient has been advised of split billing requirements and indicates understanding: Yes        BMI  Estimated body mass index is 28.74 kg/m  as calculated from the following:    Height as of this encounter: 1.753 m (5' 9\").    Weight as of this encounter: 88.3 kg (194 lb 9.6 oz).   Weight management plan: Discussed healthy diet and exercise guidelines    Counseling  Appropriate preventive services were addressed with this patient via screening, questionnaire, or discussion as appropriate for fall prevention, nutrition, physical activity, Tobacco-use cessation, social engagement, weight loss and " cognition.  Checklist reviewing preventive services available has been given to the patient.  Reviewed patient's diet, addressing concerns and/or questions.   The patient was provided with written information regarding signs of hearing loss.   Information on urinary incontinence and treatment options given to patient.   -discussed that he was due for a colonoscopy in 2018; but since he is over age 80 he does not want to do more surveillance or screening    FUTURE APPOINTMENTS:         - Follow-up for annual visit or as needed    Subjective   Yoel is a 84 year old, presenting for the following:  Physical          Health Care Directive  Patient has a Health Care Directive on file  Advance care planning document is on file and is current.    HPI              8/27/2024   General Health   How would you rate your overall physical health? Excellent   Feel stress (tense, anxious, or unable to sleep) To some extent      (!) STRESS CONCERN      8/27/2024   Nutrition   Diet: Regular (no restrictions)    Breakfast skipped       Multiple values from one day are sorted in reverse-chronological order         8/27/2024   Exercise   Days per week of moderate/strenous exercise 6 days            8/27/2024   Social Factors   Frequency of gathering with friends or relatives More than three times a week   Worry food won't last until get money to buy more No   Food not last or not have enough money for food? No   Do you have housing? (Housing is defined as stable permanent housing and does not include staying ouside in a car, in a tent, in an abandoned building, in an overnight shelter, or couch-surfing.) Yes   Are you worried about losing your housing? No   Lack of transportation? No   Unable to get utilities (heat,electricity)? No            8/27/2024   Fall Risk   Fallen 2 or more times in the past year? No   Trouble with walking or balance? No             8/27/2024   Activities of Daily Living- Home Safety   Needs help with the  following daily activites None of the above   Safety concerns in the home None of the above            8/27/2024   Dental   Dentist two times every year? Yes            8/27/2024   Hearing Screening   Hearing concerns? (!) I FEEL THAT PEOPLE ARE MUMBLING OR NOT SPEAKING CLEARLY.    (!) I NEED TO ASK PEOPLE TO SPEAK UP OR REPEAT THEMSELVES.    (!) IT'S HARD TO FOLLOW A CONVERSATION IN A NOISY RESTAURANT OR CROWDED ROOM.       Multiple values from one day are sorted in reverse-chronological order         8/27/2024   Driving Risk Screening   Patient/family members have concerns about driving No            8/27/2024   General Alertness/Fatigue Screening   Have you been more tired than usual lately? No            8/27/2024   Urinary Incontinence Screening   Bothered by leaking urine in past 6 months Yes            8/27/2024   TB Screening   Were you born outside of the [place]? No            Today's PHQ-2 Score:       8/27/2024     8:45 AM   PHQ-2 ( 1999 [employer])   Q1: Little interest or pleasure in doing things 0   Q2: Feeling down, depressed or hopeless 0   PHQ-2 Score 0   Q1: Little interest or pleasure in doing things Not at all   Q2: Feeling down, depressed or hopeless Not at all   PHQ-2 Score 0           8/27/2024   Substance Use   Alcohol more than 3/day or more than 7/wk No   Do you have a current opioid prescription? No   How severe/bad is pain from 1 to 10? 0/10 (No Pain)   Do you use any other substances recreationally? No        Social History     Tobacco Use    Smoking status: Never    Smokeless tobacco: Never   Substance Use Topics    Alcohol use: Yes     Comment: beer 1 per week    Drug use: Never                 Reviewed and updated as needed this visit by Provider    Allergies                 Past Medical History:   Diagnosis Date    BPH with urinary obstruction     Hyperlipidemia LDL goal <100     Macular degeneration (senile) of retina     Stroke (H) 08/2020    received tpa, then cea done anw     Past  Surgical History:   Procedure Laterality Date    APPENDECTOMY OPEN      HEMORRHOIDECTOMY      HERNIA REPAIR, INGUINAL RT/LT      LAPAROSCOPIC CHOLECYSTECTOMY      left total knee replacement      left wrist tendon laceration repair      MOHS MICROGRAPHIC PROCEDURE      right total knee replacement      VASECTOMY       BP Readings from Last 3 Encounters:   08/27/24 128/81   10/25/23 (!) 147/84   10/26/22 108/67    Wt Readings from Last 3 Encounters:   08/27/24 88.3 kg (194 lb 9.6 oz)   10/25/23 91.6 kg (202 lb)   10/26/22 92.5 kg (203 lb 14.4 oz)                  Patient Active Problem List   Diagnosis    History of stroke    Macular degeneration (senile) of retina    Hyperlipidemia LDL goal <70    Primary osteoarthritis of both knees    Status post right knee replacement     Past Surgical History:   Procedure Laterality Date    APPENDECTOMY OPEN      HEMORRHOIDECTOMY      HERNIA REPAIR, INGUINAL RT/LT      LAPAROSCOPIC CHOLECYSTECTOMY      left total knee replacement      left wrist tendon laceration repair      MOHS MICROGRAPHIC PROCEDURE      right total knee replacement      VASECTOMY         Social History     Tobacco Use    Smoking status: Never    Smokeless tobacco: Never   Substance Use Topics    Alcohol use: Yes     Comment: beer 1 per week     Family History   Problem Relation Age of Onset    Hypertension Mother     Colon Cancer Sister     Prostate Cancer Brother     Diabetes No family hx of     Coronary Artery Disease No family hx of     Cerebrovascular Disease No family hx of          Current Outpatient Medications   Medication Sig Dispense Refill    acetaminophen (TYLENOL) 325 MG tablet Take 650 mg by mouth every 6 hours as needed for mild pain      aspirin 81 MG EC tablet Take 81 mg by mouth      atorvastatin (LIPITOR) 40 MG tablet Take 1 tablet (40 mg) by mouth daily. 90 tablet 3    multivitamin  with lutein (OCUVITE WITH LTEIN) CAPS per capsule Take 1 capsule by mouth daily      tadalafil (CIALIS) 5  "MG tablet Take 1 tablet (5 mg) by mouth daily. 90 tablet 3     Current providers sharing in care for this patient include:  Patient Care Team:  No Ref-Primary, Physician as PCP - Kurtis Camargo MD as Assigned PCP    The following health maintenance items are reviewed in Epic and correct as of today:  Health Maintenance   Topic Date Due    COVID-19 Vaccine (5 - 2023-24 season) 09/01/2023    INFLUENZA VACCINE (1) 09/01/2024    LIPID  10/25/2024    MEDICARE ANNUAL WELLNESS VISIT  08/27/2025    ANNUAL REVIEW OF HM ORDERS  08/27/2025    FALL RISK ASSESSMENT  08/27/2025    ADVANCE CARE PLANNING  08/27/2029    DTAP/TDAP/TD IMMUNIZATION (3 - Td or Tdap) 07/27/2030    PHQ-2 (once per calendar year)  Completed    Pneumococcal Vaccine: 65+ Years  Completed    ZOSTER IMMUNIZATION  Completed    RSV VACCINE (Pregnancy & 60+)  Completed    HPV IMMUNIZATION  Aged Out    MENINGITIS IMMUNIZATION  Aged Out    RSV MONOCLONAL ANTIBODY  Aged Out            Objective    Exam  /81 (BP Location: Right arm, Patient Position: Sitting, Cuff Size: Adult Large)   Pulse 54   Temp 97.7  F (36.5  C) (Tympanic)   Resp 16   Ht 1.753 m (5' 9\")   Wt 88.3 kg (194 lb 9.6 oz)   SpO2 95%   BMI 28.74 kg/m     Estimated body mass index is 28.74 kg/m  as calculated from the following:    Height as of this encounter: 1.753 m (5' 9\").    Weight as of this encounter: 88.3 kg (194 lb 9.6 oz).    Physical Exam  GENERAL: alert and no distress; hard of hearing   EYES: Eyes grossly normal to inspection, PERRL and conjunctivae and sclerae normal  HENT: ear canals and TM's normal, nose and mouth without ulcers or lesions  NECK: no adenopathy, no asymmetry, masses, or scars  RESP: lungs clear to auscultation - no rales, rhonchi or wheezes  CV: regular rate and rhythm, normal S1 S2, no S3 or S4, soft systolic murmur noted, no click or rub, no peripheral edema  ABDOMEN: soft, nontender, no hepatosplenomegaly, no masses and bowel sounds " normal  RECTAL: normal sphincter tone, no rectal masses, prostate large size, smooth, nontender without nodules or masses  MS: no gross musculoskeletal defects noted, no edema  SKIN: no suspicious lesions or rashes  NEURO: Normal strength and tone, mentation intact and speech normal  PSYCH: mentation appears normal, affect normal/bright         8/27/2024   Mini Cog   Clock Draw Score 2 Normal   3 Item Recall 2 objects recalled   Mini Cog Total Score 4                 Signed Electronically by: Raymon Siu MD

## 2024-08-27 NOTE — RESULT ENCOUNTER NOTE
The following letter pertains to your most recent diagnostic tests:    -The metabolic panel shows stable kidney function for you (Creatinine, BUN, GFR), electrolytes (Sodium, Potassium, Calcium) are stable for you, the Glucose (blood sugar) is stable for you.     -Your cholesterol panel looks healthy.     -Your hemoglobin A1c test which averages your blood sugars over the last 3 months returned normal.  No evidence for diabetes or prediabetes.       -Your complete blood counts including your hemoglobin returned normal for you.           Bottom line:  The labs look OK for you.        Sincerely,    Dr. Siu

## 2024-08-27 NOTE — PATIENT INSTRUCTIONS
You should get a COVID shot and a flu shot at a pharmacy when you can this fall.        Patient Education   Preventive Care Advice   This is general advice given by our system to help you stay healthy. However, your care team may have specific advice just for you. Please talk to your care team about your preventive care needs.  Nutrition  Eat 5 or more servings of fruits and vegetables each day.  Try wheat bread, brown rice and whole grain pasta (instead of white bread, rice, and pasta).  Get enough calcium and vitamin D. Check the label on foods and aim for 100% of the RDA (recommended daily allowance).  Lifestyle  Exercise at least 150 minutes each week  (30 minutes a day, 5 days a week).  Do muscle strengthening activities 2 days a week. These help control your weight and prevent disease.  No smoking.  Wear sunscreen to prevent skin cancer.  Have a dental exam and cleaning every 6 months.  Yearly exams  See your health care team every year to talk about:  Any changes in your health.  Any medicines your care team has prescribed.  Preventive care, family planning, and ways to prevent chronic diseases.  Shots (vaccines)   HPV shots (up to age 26), if you've never had them before.  Hepatitis B shots (up to age 59), if you've never had them before.  COVID-19 shot: Get this shot when it's due.  Flu shot: Get a flu shot every year.  Tetanus shot: Get a tetanus shot every 10 years.  Pneumococcal, hepatitis A, and RSV shots: Ask your care team if you need these based on your risk.  Shingles shot (for age 50 and up)  General health tests  Diabetes screening:  Starting at age 35, Get screened for diabetes at least every 3 years.  If you are younger than age 35, ask your care team if you should be screened for diabetes.  Cholesterol test: At age 39, start having a cholesterol test every 5 years, or more often if advised.  Bone density scan (DEXA): At age 50, ask your care team if you should have this scan for osteoporosis  (brittle bones).  Hepatitis C: Get tested at least once in your life.  STIs (sexually transmitted infections)  Before age 24: Ask your care team if you should be screened for STIs.  After age 24: Get screened for STIs if you're at risk. You are at risk for STIs (including HIV) if:  You are sexually active with more than one person.  You don't use condoms every time.  You or a partner was diagnosed with a sexually transmitted infection.  If you are at risk for HIV, ask about PrEP medicine to prevent HIV.  Get tested for HIV at least once in your life, whether you are at risk for HIV or not.  Cancer screening tests  Cervical cancer screening: If you have a cervix, begin getting regular cervical cancer screening tests starting at age 21.  Breast cancer scan (mammogram): If you've ever had breasts, begin having regular mammograms starting at age 40. This is a scan to check for breast cancer.  Colon cancer screening: It is important to start screening for colon cancer at age 45.  Have a colonoscopy test every 10 years (or more often if you're at risk) Or, ask your provider about stool tests like a FIT test every year or Cologuard test every 3 years.  To learn more about your testing options, visit:   .  For help making a decision, visit:   https://bit.ly/hm35244.  Prostate cancer screening test: If you have a prostate, ask your care team if a prostate cancer screening test (PSA) at age 55 is right for you.  Lung cancer screening: If you are a current or former smoker ages 50 to 80, ask your care team if ongoing lung cancer screenings are right for you.  For informational purposes only. Not to replace the advice of your health care provider. Copyright   2023 [employer]. All rights reserved. Clinically reviewed by the [facility]. [place] 780622 - REV 01/24.  Hearing Loss: Care Instructions  Overview     Hearing loss is a sudden or slow decrease in how well you hear. It can range  from slight to profound. Permanent hearing loss can occur with aging. It also can happen when you are exposed long-term to loud noise. Examples include listening to loud music, riding motorcycles, or being around other loud machines.  Hearing loss can affect your work and home life. It can make you feel lonely or depressed. You may feel that you have lost your independence. But hearing aids and other devices can help you hear better and feel connected to others.  Follow-up care is a key part of your treatment and safety. Be sure to make and go to all appointments, and call your doctor if you are having problems. It's also a good idea to know your test results and keep a list of the medicines you take.  How can you care for yourself at home?  Avoid loud noises whenever possible. This helps keep your hearing from getting worse.  Always wear hearing protection around loud noises.  Wear a hearing aid as directed.  A professional can help you pick a hearing aid that will work best for you.  You can also get hearing aids over the counter for mild to moderate hearing loss.  Have hearing tests as your doctor suggests. They can show whether your hearing has changed. Your hearing aid may need to be adjusted.  Use other devices as needed. These may include:  Telephone amplifiers and hearing aids that can connect to a television, stereo, radio, or microphone.  Devices that use lights or vibrations. These alert you to the doorbell, a ringing telephone, or a baby monitor.  Television closed-captioning. This shows the words at the bottom of the screen. Most new TVs can do this.  TTY (text telephone). This lets you type messages back and forth on the telephone instead of talking or listening. These devices are also called TDD. When messages are typed on the keyboard, they are sent over the phone line to a receiving TTY. The message is shown on a monitor.  Use text messaging, social media, and email if it is hard for you to  "communicate by telephone.  Try to learn a listening technique called speechreading. It is not lipreading. You pay attention to people's gestures, expressions, posture, and tone of voice. These clues can help you understand what a person is saying. Face the person you are talking to, and have them face you. Make sure the lighting is good. You need to see the other person's face clearly.  Think about counseling if you need help to adjust to your hearing loss.  When should you call for help?  Watch closely for changes in your health, and be sure to contact your doctor if:    You think your hearing is getting worse.     You have new symptoms, such as dizziness or nausea.   Where can you learn more?  Go to https://www.[employer]Class.net/patiented  Enter R798 in the search box to learn more about \"Hearing Loss: Care Instructions.\"  Current as of: September 27, 2023               Content Version: 14.0    9855-1528 [employer].   Care instructions adapted under license by your healthcare professional. If you have questions about a medical condition or this instruction, always ask your healthcare professional. [employer] disclaims any warranty or liability for your use of this information.      Bladder Training: Care Instructions  Your Care Instructions     Bladder training is used to treat urge incontinence and stress incontinence. Urge incontinence means that the need to urinate comes on so fast that you can't get to a toilet in time. Stress incontinence means that you leak urine because of pressure on your bladder. For example, it may happen when you laugh, cough, or lift something heavy.  Bladder training can increase how long you can wait before you have to urinate. It can also help your bladder hold more urine. And it can give you better control over the urge to urinate.  It is important to remember that bladder training takes a few weeks to a few months to make a difference. You may not see " results right away, but don't give up.  Follow-up care is a key part of your treatment and safety. Be sure to make and go to all appointments, and call your doctor if you are having problems. It's also a good idea to know your test results and keep a list of the medicines you take.  How can you care for yourself at home?  Work with your doctor to come up with a bladder training program that is right for you. You may use one or more of the following methods.  Delayed urination  In the beginning, try to keep from urinating for 5 minutes after you first feel the need to go.  While you wait, take deep, slow breaths to relax. Kegel exercises can also help you delay the need to go to the bathroom.  After some practice, when you can easily wait 5 minutes to urinate, try to wait 10 minutes before you urinate.  Slowly increase the waiting period until you are able to control when you have to urinate.  Scheduled urination  Empty your bladder when you first wake up in the morning.  Schedule times throughout the day when you will urinate.  Start by going to the bathroom every hour, even if you don't need to go.  Slowly increase the time between trips to the bathroom.  When you have found a schedule that works well for you, keep doing it.  If you wake up during the night and have to urinate, do it. Apply your schedule to waking hours only.  Kegel exercises  These tighten and strengthen pelvic muscles, which can help you control the flow of urine. (If doing these exercises causes pain, stop doing them and talk with your doctor.) To do Kegel exercises:  Squeeze your muscles as if you were trying not to pass gas. Or squeeze your muscles as if you were stopping the flow of urine. Your belly, legs, and buttocks shouldn't move.  Hold the squeeze for 3 seconds, then relax for 5 to 10 seconds.  Start with 3 seconds, then add 1 second each week until you are able to squeeze for 10 seconds.  Repeat the exercise 10 times a session. Do 3 to  "8 sessions a day.  When should you call for help?  Watch closely for changes in your health, and be sure to contact your doctor if:    Your incontinence is getting worse.     You do not get better as expected.   Where can you learn more?  Go to https://www.[employer].net/patiented  Enter V684 in the search box to learn more about \"Bladder Training: Care Instructions.\"  Current as of: November 15, 2023               Content Version: 14.0    7167-9125 [employer].   Care instructions adapted under license by your healthcare professional. If you have questions about a medical condition or this instruction, always ask your healthcare professional. [employer] disclaims any warranty or liability for your use of this information.         "

## 2024-08-28 RX ORDER — TADALAFIL 5 MG/1
5 TABLET ORAL DAILY
Qty: 90 TABLET | Refills: 3 | Status: SHIPPED | OUTPATIENT
Start: 2024-08-28

## 2024-09-30 ENCOUNTER — OFFICE VISIT (OUTPATIENT)
Dept: UROLOGY | Facility: CLINIC | Age: 84
End: 2024-09-30
Attending: INTERNAL MEDICINE
Payer: COMMERCIAL

## 2024-09-30 VITALS — DIASTOLIC BLOOD PRESSURE: 71 MMHG | SYSTOLIC BLOOD PRESSURE: 138 MMHG | HEART RATE: 58 BPM | OXYGEN SATURATION: 97 %

## 2024-09-30 DIAGNOSIS — N40.1 BENIGN PROSTATIC HYPERPLASIA WITH URINARY FREQUENCY: Primary | ICD-10-CM

## 2024-09-30 DIAGNOSIS — R35.0 BENIGN PROSTATIC HYPERPLASIA WITH URINARY FREQUENCY: Primary | ICD-10-CM

## 2024-09-30 LAB
ALBUMIN UR-MCNC: NEGATIVE MG/DL
APPEARANCE UR: CLEAR
BILIRUB UR QL STRIP: NEGATIVE
COLOR UR AUTO: YELLOW
GLUCOSE UR STRIP-MCNC: NEGATIVE MG/DL
HGB UR QL STRIP: NEGATIVE
KETONES UR STRIP-MCNC: NEGATIVE MG/DL
LEUKOCYTE ESTERASE UR QL STRIP: NEGATIVE
NITRATE UR QL: NEGATIVE
PH UR STRIP: 6 [PH] (ref 5–7)
RESIDUAL VOLUME (RV) (EXTERNAL): 20
SP GR UR STRIP: 1.02 (ref 1–1.03)
UROBILINOGEN UR STRIP-ACNC: 0.2 E.U./DL

## 2024-09-30 PROCEDURE — 81003 URINALYSIS AUTO W/O SCOPE: CPT | Mod: QW | Performed by: NURSE PRACTITIONER

## 2024-09-30 PROCEDURE — 99203 OFFICE O/P NEW LOW 30 MIN: CPT | Mod: 25 | Performed by: NURSE PRACTITIONER

## 2024-09-30 PROCEDURE — 51798 US URINE CAPACITY MEASURE: CPT | Performed by: NURSE PRACTITIONER

## 2024-09-30 RX ORDER — FINASTERIDE 5 MG/1
5 TABLET, FILM COATED ORAL DAILY
Qty: 90 TABLET | Refills: 4 | Status: SHIPPED | OUTPATIENT
Start: 2024-09-30

## 2024-09-30 NOTE — LETTER
9/30/2024       RE: Yoel Stevenson  [address]  [facility] [address]     Dear Colleague,    Thank you for referring your patient, Yoel Stevenson, to the [facility]. Please see a copy of my visit note below.      Urology Outpatient Visit    Name: Yoel Stevenson    MRN: [id number]   YOB: 1940               Chief Complaint:   LUTS/BPH         Impression and Plan:   Impression / Plan:   Yoel Stevenson is a 84 year old male with BPH, ED      It was my pleasure to meet with Mr. Stevenson in clinic today to discuss his lower urinary tract symptoms. Patient presentation is not consistent with prostate cancer, UTI, urinary obstruction, prostatitis, neurogenic bladder, diabetes, nor diuretic related. I explained that his lower urinary tract symptoms are likely secondary to benign prostatic hyperplasia (BPH). We reviewed the natural history of BPH, its prevalence, and management options. We discussed that, in general, treatment of BPH is based on the extent of bother caused by lower urinary tract symptoms. We then reviewed options for ongoing management including observation/watchful waiting, lifestyle modifications, medical management, vs. other surgical options. Pros and cons of these options were discussed in detail. All patient questions, comments, concerns addressed.     Following our discussion, Mr. Stevenson expressed an interest in proceeding with finasteride.    -Plan to initiate finasteride. Discussed with patient that this medication works by shrinking the size of the prostate. Discussed monitoring for signs of severe allergic reaction, including hives, trouble breathing/talking, and to seek care immediately if these symptoms develop. Discussed potential side effects including gynecomastia, nipple discharge, ED, dizziness related to orthostatic hypotension.     Thank you for the opportunity to  participate in the care of Yoel Stevenson.     VAISHALI Vincent, CNP  [facility] - Department of Urology  [phone]          History of Present Illness:   Yoel Stevenson is a 84 year old male with Hx BPH & ED.    Takes Cialis daily, works well for him.    Walks every day, stays active.     No Hx blood in urine    Tried Flomax in past but caused dizziness    AUASS 6-5-1-1-0-0-4    PVR 20 mL    [place] [facility]    Lab Results   Component Value Date    PSA 1.69 10/04/2022    PSA 1.66 09/30/2021    PSA 1.57 07/30/2019     History is obtained from patient & EMR          Past Medical History:     Past Medical History:   Diagnosis Date     BPH with urinary obstruction      Hyperlipidemia LDL goal <100      Macular degeneration (senile) of retina      Stroke (H) 08/2020    received tpa, then cea done anw          Past Surgical History:     Past Surgical History:   Procedure Laterality Date     APPENDECTOMY OPEN       HEMORRHOIDECTOMY       HERNIA REPAIR, INGUINAL RT/LT       LAPAROSCOPIC CHOLECYSTECTOMY       left total knee replacement       left wrist tendon laceration repair       MOHS MICROGRAPHIC PROCEDURE       right total knee replacement       VASECTOMY            Social History:     Social History     Tobacco Use     Smoking status: Never     Smokeless tobacco: Never   Substance Use Topics     Alcohol use: Yes     Comment: beer 1 per week          Family History:     Family History   Problem Relation Age of Onset     Hypertension Mother      Colon Cancer Sister      Prostate Cancer Brother      Diabetes No family hx of      Coronary Artery Disease No family hx of      Cerebrovascular Disease No family hx of           Allergies:     Allergies   Allergen Reactions     Flomax [Tamsulosin]      dizziness          Medications:     Current Outpatient Medications   Medication Sig Dispense Refill     acetaminophen (TYLENOL) 325 MG tablet Take 650 mg by mouth every 6 hours as needed for mild pain       aspirin 81 MG EC  tablet Take 81 mg by mouth       atorvastatin (LIPITOR) 40 MG tablet Take 1 tablet (40 mg) by mouth daily. 90 tablet 3     multivitamin  with lutein (OCUVITE WITH LTEIN) CAPS per capsule Take 1 capsule by mouth daily       tadalafil (CIALIS) 5 MG tablet Take 1 tablet (5 mg) by mouth daily. 90 tablet 3     No current facility-administered medications for this visit.          Review of Systems:    ROS: See HPI for pertinent details.  Remainder of 10-point ROS negative.         Physical Exam:   VS:  T: Data Unavailable    HR: 58    BP: 138/71    RR: Data Unavailable     GEN:  Alert.  NAD.   HEENT:  Sclerae anicteric.    CV:  No obvious jugular venous distension.  LUNGS: No respiratory distress, breathing comfortably wo accessory muscle use.  ABD:  ND.     SKIN:  Dry. No visible rashes.   NEURO:  CN grossly intact.          Laboratory Data:     Lab Results   Component Value Date    PSA 1.69 10/04/2022    PSA 1.66 09/30/2021    PSA 1.57 07/30/2019     Lab Results   Component Value Date    CR 0.96 08/27/2024    CR 0.97 10/25/2023    CR 0.86 10/04/2022    CR 0.98 09/30/2021    CR 0.94 09/24/2020    CR 0.91 08/19/2020    CR 0.93 08/15/2020    CR 0.91 07/30/2019     Lab Results   Component Value Date    GFRESTIMATED 78 08/27/2024    GFRESTIMATED 77 10/25/2023    GFRESTIMATED 86 10/04/2022    GFRESTIMATED 72 09/30/2021    GFRESTIMATED 76 09/24/2020    GFRESTIMATED >60 08/19/2020    GFRESTIMATED 77 08/15/2020    GFRESTIMATED 80 07/30/2019            Again, thank you for allowing me to participate in the care of your patient.      Sincerely,    Odilia Wilhelm, VAISHALI CNP

## 2024-09-30 NOTE — PROGRESS NOTES
Urology Outpatient Visit    Name: Yoel Stevenson    MRN: [id number]   YOB: 1940               Chief Complaint:   LUTS/BPH         Impression and Plan:   Impression / Plan:   Yoel Stevenson is a 84 year old male with BPH, ED      It was my pleasure to meet with Mr. Stevenson in clinic today to discuss his lower urinary tract symptoms. Patient presentation is not consistent with prostate cancer, UTI, urinary obstruction, prostatitis, neurogenic bladder, diabetes, nor diuretic related. I explained that his lower urinary tract symptoms are likely secondary to benign prostatic hyperplasia (BPH). We reviewed the natural history of BPH, its prevalence, and management options. We discussed that, in general, treatment of BPH is based on the extent of bother caused by lower urinary tract symptoms. We then reviewed options for ongoing management including observation/watchful waiting, lifestyle modifications, medical management, vs. other surgical options. Pros and cons of these options were discussed in detail. All patient questions, comments, concerns addressed.     Following our discussion, Mr. Stevenson expressed an interest in proceeding with finasteride.    -Plan to initiate finasteride. Discussed with patient that this medication works by shrinking the size of the prostate. Discussed monitoring for signs of severe allergic reaction, including hives, trouble breathing/talking, and to seek care immediately if these symptoms develop. Discussed potential side effects including gynecomastia, nipple discharge, ED, dizziness related to orthostatic hypotension.     Thank you for the opportunity to participate in the care of Yoel Stevenson.     VAISHALI Vincent, CNP  [facility] - Department of Urology  [phone]          History of Present Illness:   Yoel Stevenson is a 84 year old male with Hx BPH & ED.    Takes Cialis daily, works well for him.    Walks every day, stays active.     No Hx blood in  urine    Tried Flomax in past but caused dizziness    AUASS 4-6-6-1-0-0-4    PVR 20 mL    [place] [facility]    Lab Results   Component Value Date    PSA 1.69 10/04/2022    PSA 1.66 09/30/2021    PSA 1.57 07/30/2019     History is obtained from patient & EMR          Past Medical History:     Past Medical History:   Diagnosis Date    BPH with urinary obstruction     Hyperlipidemia LDL goal <100     Macular degeneration (senile) of retina     Stroke (H) 08/2020    received tpa, then cea done anw          Past Surgical History:     Past Surgical History:   Procedure Laterality Date    APPENDECTOMY OPEN      HEMORRHOIDECTOMY      HERNIA REPAIR, INGUINAL RT/LT      LAPAROSCOPIC CHOLECYSTECTOMY      left total knee replacement      left wrist tendon laceration repair      MOHS MICROGRAPHIC PROCEDURE      right total knee replacement      VASECTOMY            Social History:     Social History     Tobacco Use    Smoking status: Never    Smokeless tobacco: Never   Substance Use Topics    Alcohol use: Yes     Comment: beer 1 per week          Family History:     Family History   Problem Relation Age of Onset    Hypertension Mother     Colon Cancer Sister     Prostate Cancer Brother     Diabetes No family hx of     Coronary Artery Disease No family hx of     Cerebrovascular Disease No family hx of           Allergies:     Allergies   Allergen Reactions    Flomax [Tamsulosin]      dizziness          Medications:     Current Outpatient Medications   Medication Sig Dispense Refill    acetaminophen (TYLENOL) 325 MG tablet Take 650 mg by mouth every 6 hours as needed for mild pain      aspirin 81 MG EC tablet Take 81 mg by mouth      atorvastatin (LIPITOR) 40 MG tablet Take 1 tablet (40 mg) by mouth daily. 90 tablet 3    multivitamin  with lutein (OCUVITE WITH LTEIN) CAPS per capsule Take 1 capsule by mouth daily      tadalafil (CIALIS) 5 MG tablet Take 1 tablet (5 mg) by mouth daily. 90 tablet 3     No current facility-administered  medications for this visit.          Review of Systems:    ROS: See HPI for pertinent details.  Remainder of 10-point ROS negative.         Physical Exam:   VS:  T: Data Unavailable    HR: 58    BP: 138/71    RR: Data Unavailable     GEN:  Alert.  NAD.   HEENT:  Sclerae anicteric.    CV:  No obvious jugular venous distension.  LUNGS: No respiratory distress, breathing comfortably wo accessory muscle use.  ABD:  ND.     SKIN:  Dry. No visible rashes.   NEURO:  CN grossly intact.          Laboratory Data:     Lab Results   Component Value Date    PSA 1.69 10/04/2022    PSA 1.66 09/30/2021    PSA 1.57 07/30/2019     Lab Results   Component Value Date    CR 0.96 08/27/2024    CR 0.97 10/25/2023    CR 0.86 10/04/2022    CR 0.98 09/30/2021    CR 0.94 09/24/2020    CR 0.91 08/19/2020    CR 0.93 08/15/2020    CR 0.91 07/30/2019     Lab Results   Component Value Date    GFRESTIMATED 78 08/27/2024    GFRESTIMATED 77 10/25/2023    GFRESTIMATED 86 10/04/2022    GFRESTIMATED 72 09/30/2021    GFRESTIMATED 76 09/24/2020    GFRESTIMATED >60 08/19/2020    GFRESTIMATED 77 08/15/2020    GFRESTIMATED 80 07/30/2019

## 2024-09-30 NOTE — NURSING NOTE
Pt is up 3-4 times a nt to void.  Pt denies dysuria.    Pt states flomax made him dizzy.    Pt had DYLAN last month.  Doc said all is good with that.    PVR by scanner= 20mL    Pt stops fluids at 7pm.    Pt states he goes freq at nt and large amounts of output.    YORDY Lockhart CMA

## 2024-10-24 ENCOUNTER — TRANSFERRED RECORDS (OUTPATIENT)
Dept: HEALTH INFORMATION MANAGEMENT | Facility: CLINIC | Age: 84
End: 2024-10-24
Payer: COMMERCIAL

## 2025-03-13 ENCOUNTER — TRANSFERRED RECORDS (OUTPATIENT)
Dept: HEALTH INFORMATION MANAGEMENT | Facility: CLINIC | Age: 85
End: 2025-03-13
Payer: COMMERCIAL

## 2025-06-19 ENCOUNTER — OFFICE VISIT (OUTPATIENT)
Dept: FAMILY MEDICINE | Facility: CLINIC | Age: 85
End: 2025-06-19
Payer: COMMERCIAL

## 2025-06-19 VITALS
DIASTOLIC BLOOD PRESSURE: 73 MMHG | SYSTOLIC BLOOD PRESSURE: 113 MMHG | RESPIRATION RATE: 20 BRPM | TEMPERATURE: 97.5 F | OXYGEN SATURATION: 98 % | BODY MASS INDEX: 29.83 KG/M2 | WEIGHT: 201.4 LBS | HEIGHT: 69 IN | HEART RATE: 66 BPM

## 2025-06-19 DIAGNOSIS — G47.30 SLEEP APNEA, UNSPECIFIED TYPE: Primary | ICD-10-CM

## 2025-06-19 ASSESSMENT — PAIN SCALES - GENERAL: PAINLEVEL_OUTOF10: NO PAIN (0)

## 2025-06-19 NOTE — PROGRESS NOTES
"  Assessment & Plan     Sleep apnea, unspecified type  Wife noting sleep apnea. Consult ordered   - Adult Sleep Eval & Management  Referral; Future            Subjective   Yoel is a 85 year old, presenting for the following health issues:  sleep disorder (Patient is here for possible sleep disorder.  Would like a referral for sleep study.)      6/19/2025     2:24 PM   Additional Questions   Roomed by Jake FELIZ MA   Accompanied by Sveta     History of Present Illness       Reason for visit:  Sleep issue  Symptom onset:  More than a month  Symptom intensity:  Moderate  Symptom progression:  Staying the same  Had these symptoms before:  Yes  Has tried/received treatment for these symptoms:  No   He is taking medications regularly.      Wife has been noticing apnea   If he is on his back he will stop breathing and gasp. Then will breathe heavy, slows down and his wife will count. When she gets up to 40 she will wake him up.   He also wakes up 4 times a night to urinate. Flomax made him dizzy so he stopped        Review of Systems  Detailed as above         Objective    /73 (BP Location: Left arm, Patient Position: Sitting, Cuff Size: Adult Large)   Pulse 66   Temp 97.5  F (36.4  C) (Temporal)   Resp 20   Ht 1.753 m (5' 9\")   Wt 91.4 kg (201 lb 6.4 oz)   SpO2 98%   BMI 29.74 kg/m    Body mass index is 29.74 kg/m .  Physical Exam  Constitutional:       Appearance: Normal appearance.   Pulmonary:      Effort: Pulmonary effort is normal.   Neurological:      Mental Status: He is alert.   Psychiatric:         Mood and Affect: Mood normal.                    Signed Electronically by: VAISHALI Regalado CNP    "